# Patient Record
Sex: FEMALE | Race: WHITE | NOT HISPANIC OR LATINO | Employment: FULL TIME | URBAN - METROPOLITAN AREA
[De-identification: names, ages, dates, MRNs, and addresses within clinical notes are randomized per-mention and may not be internally consistent; named-entity substitution may affect disease eponyms.]

---

## 2017-01-18 ENCOUNTER — PATIENT MESSAGE (OUTPATIENT)
Dept: INTERNAL MEDICINE | Facility: CLINIC | Age: 52
End: 2017-01-18

## 2017-01-18 DIAGNOSIS — Z12.39 SCREENING FOR MALIGNANT NEOPLASM OF BREAST: Primary | ICD-10-CM

## 2017-01-19 ENCOUNTER — OFFICE VISIT (OUTPATIENT)
Dept: INTERNAL MEDICINE | Facility: CLINIC | Age: 52
End: 2017-01-19
Payer: COMMERCIAL

## 2017-01-19 VITALS
HEART RATE: 84 BPM | SYSTOLIC BLOOD PRESSURE: 110 MMHG | BODY MASS INDEX: 31.5 KG/M2 | WEIGHT: 196 LBS | DIASTOLIC BLOOD PRESSURE: 68 MMHG | HEIGHT: 66 IN | TEMPERATURE: 98 F

## 2017-01-19 DIAGNOSIS — R30.0 DYSURIA: Primary | ICD-10-CM

## 2017-01-19 LAB
BILIRUB SERPL-MCNC: NEGATIVE MG/DL
BLOOD URINE, POC: NORMAL
COLOR, POC UA: YELLOW
GLUCOSE UR QL STRIP: NORMAL
KETONES UR QL STRIP: NEGATIVE
LEUKOCYTE ESTERASE URINE, POC: NORMAL
NITRITE, POC UA: POSITIVE
PH, POC UA: 6
PROTEIN, POC: NEGATIVE
SPECIFIC GRAVITY, POC UA: 1.01
UROBILINOGEN, POC UA: NORMAL

## 2017-01-19 PROCEDURE — 87186 SC STD MICRODIL/AGAR DIL: CPT

## 2017-01-19 PROCEDURE — 3074F SYST BP LT 130 MM HG: CPT | Mod: S$GLB,,, | Performed by: FAMILY MEDICINE

## 2017-01-19 PROCEDURE — 1159F MED LIST DOCD IN RCRD: CPT | Mod: S$GLB,,, | Performed by: FAMILY MEDICINE

## 2017-01-19 PROCEDURE — 99213 OFFICE O/P EST LOW 20 MIN: CPT | Mod: 25,S$GLB,, | Performed by: FAMILY MEDICINE

## 2017-01-19 PROCEDURE — 87077 CULTURE AEROBIC IDENTIFY: CPT

## 2017-01-19 PROCEDURE — 3078F DIAST BP <80 MM HG: CPT | Mod: S$GLB,,, | Performed by: FAMILY MEDICINE

## 2017-01-19 PROCEDURE — 87088 URINE BACTERIA CULTURE: CPT

## 2017-01-19 PROCEDURE — 87086 URINE CULTURE/COLONY COUNT: CPT

## 2017-01-19 PROCEDURE — 99999 PR PBB SHADOW E&M-EST. PATIENT-LVL III: CPT | Mod: PBBFAC,,, | Performed by: FAMILY MEDICINE

## 2017-01-19 PROCEDURE — 81002 URINALYSIS NONAUTO W/O SCOPE: CPT | Mod: S$GLB,,, | Performed by: FAMILY MEDICINE

## 2017-01-19 RX ORDER — CIPROFLOXACIN 500 MG/1
500 TABLET ORAL 2 TIMES DAILY
Qty: 10 TABLET | Refills: 0 | Status: SHIPPED | OUTPATIENT
Start: 2017-01-19 | End: 2017-01-24

## 2017-01-19 NOTE — PROGRESS NOTES
Subjective:      Patient ID: Makayla Neal is a 51 y.o. female.    Chief Complaint: burning during urination    HPI Comments: Patient recently completed back surgery where she was catheterized during the procedure.  She is healed well from her lumbar spinal surgery however this week she noticed that she was having burning with urination.  She also started her menstrual cycle today which was the first time in about a year and a half.  She's denies any fever.    Dysuria    This is a new problem. The current episode started in the past 7 days. The problem occurs every urination. The problem has been gradually worsening. The quality of the pain is described as aching and burning. The pain is at a severity of 5/10. The pain is moderate. There has been no fever. She is sexually active. There is no history of pyelonephritis. Associated symptoms include frequency, hematuria and urgency. Pertinent negatives include no discharge, flank pain, nausea or withholding. She has tried increased fluids (azo) for the symptoms. The treatment provided mild relief. Her past medical history is significant for catheterization.       Lab Results   Component Value Date    WBC 6.46 10/11/2016    HGB 12.2 10/11/2016    HCT 37.8 10/11/2016     10/11/2016    CHOL 193 02/10/2016    TRIG 160 (H) 02/10/2016    HDL 64 02/10/2016    ALT 18 10/11/2016    AST 21 10/11/2016     10/11/2016    K 3.9 10/11/2016     10/11/2016    CREATININE 0.9 10/11/2016    BUN 15 10/11/2016    CO2 31 (H) 10/11/2016    TSH 3.142 02/10/2016    INR 1.0 10/11/2016    HGBA1C 5.5 02/10/2016       Review of Systems   Constitutional: Negative for activity change, appetite change, fatigue and unexpected weight change.   Gastrointestinal: Negative for nausea.   Genitourinary: Positive for dysuria, frequency, hematuria, urgency and vaginal bleeding. Negative for difficulty urinating, flank pain, menstrual problem, pelvic pain, vaginal discharge and vaginal pain.    Musculoskeletal: Negative for arthralgias and back pain.     Objective:     Physical Exam   Constitutional: She appears well-developed and well-nourished.   Abdominal: Soft. She exhibits no distension. There is no tenderness.   Musculoskeletal:   Well healed incision over lumbar spine of L4-S1   Vitals reviewed.    Assessment:     1. Dysuria      Plan:   Makayla was seen today for burning during urination.    Diagnoses and all orders for this visit:    Dysuria- new. Suspect uti. + nitrates, leukocytes and blood.   Comments:   x 1 week, taking with azo, tx with abx of cipro x 5 days, culture urine.   Orders:  -     POCT URINE DIPSTICK WITHOUT MICROSCOPE; Future  -     Urine culture; Future  -     Urine culture  -     POCT URINE DIPSTICK WITHOUT MICROSCOPE    Other orders  -     ciprofloxacin HCl (CIPRO) 500 MG tablet; Take 1 tablet (500 mg total) by mouth 2 (two) times daily.            Return if symptoms worsen or fail to improve.

## 2017-01-21 LAB — BACTERIA UR CULT: NORMAL

## 2017-01-25 ENCOUNTER — PATIENT MESSAGE (OUTPATIENT)
Dept: INTERNAL MEDICINE | Facility: CLINIC | Age: 52
End: 2017-01-25

## 2017-01-25 ENCOUNTER — TELEPHONE (OUTPATIENT)
Dept: INTERNAL MEDICINE | Facility: CLINIC | Age: 52
End: 2017-01-25

## 2017-01-25 DIAGNOSIS — N39.0 URINARY TRACT INFECTION WITHOUT HEMATURIA, SITE UNSPECIFIED: Primary | ICD-10-CM

## 2017-01-25 RX ORDER — NITROFURANTOIN 25; 75 MG/1; MG/1
100 CAPSULE ORAL 2 TIMES DAILY
Qty: 20 CAPSULE | Refills: 0 | Status: SHIPPED | OUTPATIENT
Start: 2017-01-25 | End: 2017-02-04

## 2017-01-25 NOTE — TELEPHONE ENCOUNTER
Please have patient drop off another urine culture due to still having uti symptoms after cipro which urine was sensitive to cipro. I sent in macrobid for her to start after she drops off next specimen.

## 2017-01-27 ENCOUNTER — LAB VISIT (OUTPATIENT)
Dept: LAB | Facility: HOSPITAL | Age: 52
End: 2017-01-27
Attending: FAMILY MEDICINE
Payer: COMMERCIAL

## 2017-01-27 DIAGNOSIS — N39.0 URINARY TRACT INFECTION WITHOUT HEMATURIA, SITE UNSPECIFIED: ICD-10-CM

## 2017-01-27 PROCEDURE — 87086 URINE CULTURE/COLONY COUNT: CPT

## 2017-01-28 LAB — BACTERIA UR CULT: NO GROWTH

## 2017-02-09 ENCOUNTER — PATIENT OUTREACH (OUTPATIENT)
Dept: ADMINISTRATIVE | Facility: HOSPITAL | Age: 52
End: 2017-02-09

## 2017-02-27 ENCOUNTER — PATIENT MESSAGE (OUTPATIENT)
Dept: INTERNAL MEDICINE | Facility: CLINIC | Age: 52
End: 2017-02-27

## 2017-02-27 DIAGNOSIS — I10 ESSENTIAL HYPERTENSION: ICD-10-CM

## 2017-02-27 DIAGNOSIS — E55.9 VITAMIN D DEFICIENCY DISEASE: ICD-10-CM

## 2017-02-27 RX ORDER — OLMESARTAN MEDOXOMIL AND HYDROCHLOROTHIAZIDE 20/12.5 20; 12.5 MG/1; MG/1
1 TABLET ORAL DAILY
Qty: 90 TABLET | Refills: 1 | Status: CANCELLED | OUTPATIENT
Start: 2017-02-27

## 2017-02-27 RX ORDER — ERGOCALCIFEROL 1.25 MG/1
50000 CAPSULE ORAL
Qty: 24 CAPSULE | Refills: 1 | Status: SHIPPED | OUTPATIENT
Start: 2017-02-27 | End: 2017-05-09 | Stop reason: SDUPTHER

## 2017-02-27 RX ORDER — OLMESARTAN MEDOXOMIL AND HYDROCHLOROTHIAZIDE 20/12.5 20; 12.5 MG/1; MG/1
1 TABLET ORAL DAILY
Qty: 90 TABLET | Refills: 1 | Status: SHIPPED | OUTPATIENT
Start: 2017-02-27 | End: 2017-05-09 | Stop reason: SDUPTHER

## 2017-02-27 RX ORDER — FUROSEMIDE 20 MG/1
20 TABLET ORAL EVERY MORNING
Qty: 90 TABLET | Refills: 1 | Status: SHIPPED | OUTPATIENT
Start: 2017-02-27 | End: 2017-05-09 | Stop reason: SDUPTHER

## 2017-03-16 DIAGNOSIS — I10 ESSENTIAL HYPERTENSION: Primary | ICD-10-CM

## 2017-05-09 ENCOUNTER — LAB VISIT (OUTPATIENT)
Dept: LAB | Facility: HOSPITAL | Age: 52
End: 2017-05-09
Attending: FAMILY MEDICINE
Payer: COMMERCIAL

## 2017-05-09 ENCOUNTER — OFFICE VISIT (OUTPATIENT)
Dept: INTERNAL MEDICINE | Facility: CLINIC | Age: 52
End: 2017-05-09
Payer: COMMERCIAL

## 2017-05-09 VITALS
BODY MASS INDEX: 31.89 KG/M2 | TEMPERATURE: 98 F | SYSTOLIC BLOOD PRESSURE: 100 MMHG | HEART RATE: 72 BPM | DIASTOLIC BLOOD PRESSURE: 80 MMHG | WEIGHT: 198.44 LBS | HEIGHT: 66 IN

## 2017-05-09 DIAGNOSIS — I10 ESSENTIAL HYPERTENSION: ICD-10-CM

## 2017-05-09 DIAGNOSIS — Z00.00 ROUTINE GENERAL MEDICAL EXAMINATION AT A HEALTH CARE FACILITY: ICD-10-CM

## 2017-05-09 DIAGNOSIS — E55.9 VITAMIN D DEFICIENCY DISEASE: ICD-10-CM

## 2017-05-09 DIAGNOSIS — Z12.31 ENCOUNTER FOR SCREENING MAMMOGRAM FOR BREAST CANCER: ICD-10-CM

## 2017-05-09 DIAGNOSIS — Z23 NEED FOR TDAP VACCINATION: ICD-10-CM

## 2017-05-09 DIAGNOSIS — Z12.11 COLON CANCER SCREENING: ICD-10-CM

## 2017-05-09 DIAGNOSIS — Z00.00 ROUTINE GENERAL MEDICAL EXAMINATION AT A HEALTH CARE FACILITY: Primary | ICD-10-CM

## 2017-05-09 LAB
25(OH)D3+25(OH)D2 SERPL-MCNC: 44 NG/ML
ALBUMIN SERPL BCP-MCNC: 4.1 G/DL
ALP SERPL-CCNC: 116 U/L
ALT SERPL W/O P-5'-P-CCNC: 24 U/L
ANION GAP SERPL CALC-SCNC: 13 MMOL/L
AST SERPL-CCNC: 26 U/L
BILIRUB SERPL-MCNC: 0.6 MG/DL
BUN SERPL-MCNC: 25 MG/DL
CALCIUM SERPL-MCNC: 9.5 MG/DL
CHLORIDE SERPL-SCNC: 100 MMOL/L
CHOLEST/HDLC SERPL: 3.3 {RATIO}
CO2 SERPL-SCNC: 27 MMOL/L
CREAT SERPL-MCNC: 0.9 MG/DL
EST. GFR  (AFRICAN AMERICAN): >60 ML/MIN/1.73 M^2
EST. GFR  (NON AFRICAN AMERICAN): >60 ML/MIN/1.73 M^2
GLUCOSE SERPL-MCNC: 76 MG/DL
HDL/CHOLESTEROL RATIO: 30.1 %
HDLC SERPL-MCNC: 216 MG/DL
HDLC SERPL-MCNC: 65 MG/DL
LDLC SERPL CALC-MCNC: 123.6 MG/DL
NONHDLC SERPL-MCNC: 151 MG/DL
POTASSIUM SERPL-SCNC: 4.2 MMOL/L
PROT SERPL-MCNC: 7.4 G/DL
SODIUM SERPL-SCNC: 140 MMOL/L
TRIGL SERPL-MCNC: 137 MG/DL
TSH SERPL DL<=0.005 MIU/L-ACNC: 3.66 UIU/ML

## 2017-05-09 PROCEDURE — 90471 IMMUNIZATION ADMIN: CPT | Mod: S$GLB,,, | Performed by: FAMILY MEDICINE

## 2017-05-09 PROCEDURE — 82306 VITAMIN D 25 HYDROXY: CPT

## 2017-05-09 PROCEDURE — 99396 PREV VISIT EST AGE 40-64: CPT | Mod: S$GLB,,, | Performed by: FAMILY MEDICINE

## 2017-05-09 PROCEDURE — 36415 COLL VENOUS BLD VENIPUNCTURE: CPT | Mod: PO

## 2017-05-09 PROCEDURE — 84443 ASSAY THYROID STIM HORMONE: CPT

## 2017-05-09 PROCEDURE — 90715 TDAP VACCINE 7 YRS/> IM: CPT | Mod: S$GLB,,, | Performed by: FAMILY MEDICINE

## 2017-05-09 PROCEDURE — 80053 COMPREHEN METABOLIC PANEL: CPT

## 2017-05-09 PROCEDURE — 3074F SYST BP LT 130 MM HG: CPT | Mod: S$GLB,,, | Performed by: FAMILY MEDICINE

## 2017-05-09 PROCEDURE — 3079F DIAST BP 80-89 MM HG: CPT | Mod: S$GLB,,, | Performed by: FAMILY MEDICINE

## 2017-05-09 PROCEDURE — 80061 LIPID PANEL: CPT

## 2017-05-09 PROCEDURE — 99999 PR PBB SHADOW E&M-EST. PATIENT-LVL III: CPT | Mod: PBBFAC,,, | Performed by: FAMILY MEDICINE

## 2017-05-09 RX ORDER — OLMESARTAN MEDOXOMIL AND HYDROCHLOROTHIAZIDE 20/12.5 20; 12.5 MG/1; MG/1
1 TABLET ORAL DAILY
Qty: 90 TABLET | Refills: 3 | Status: SHIPPED | OUTPATIENT
Start: 2017-05-09 | End: 2018-05-10 | Stop reason: SDUPTHER

## 2017-05-09 RX ORDER — TRAMADOL HYDROCHLORIDE 50 MG/1
50 TABLET ORAL DAILY PRN
Qty: 90 TABLET | Refills: 1 | Status: SHIPPED | OUTPATIENT
Start: 2017-05-09 | End: 2018-05-10

## 2017-05-09 RX ORDER — ERGOCALCIFEROL 1.25 MG/1
50000 CAPSULE ORAL
Qty: 24 CAPSULE | Refills: 1 | Status: SHIPPED | OUTPATIENT
Start: 2017-05-11 | End: 2018-05-10 | Stop reason: SDUPTHER

## 2017-05-09 RX ORDER — FUROSEMIDE 20 MG/1
20 TABLET ORAL EVERY MORNING
Qty: 90 TABLET | Refills: 3 | Status: SHIPPED | OUTPATIENT
Start: 2017-05-09 | End: 2018-05-10 | Stop reason: SDUPTHER

## 2017-05-09 NOTE — PROGRESS NOTES
Subjective:      Patient ID: Makayla Neal is a 52 y.o. female.    Chief Complaint: Annual Exam    HPI Comments: Patient presents today for prevention exam.  Since I last saw her she is doing well.  Her blood pressures very well controlled with her medications are Benicar HCT and furosemide.  She has had back surgery and now is only on tramadol once per day.  She denies a complex with her back.  She is needing to get back in with her gynecologist as it's been more than 3 years since her last Pap smear and more than 2 years since her last mammogram.  She sees Dr. Hiwot Reardon.  Vitamin D has been low but has been controlled with twice weekly supplementation.       Lab Results   Component Value Date    WBC 6.46 10/11/2016    HGB 12.2 10/11/2016    HCT 37.8 10/11/2016     10/11/2016    CHOL 193 02/10/2016    TRIG 160 (H) 02/10/2016    HDL 64 02/10/2016    ALT 18 10/11/2016    AST 21 10/11/2016     10/11/2016    K 3.9 10/11/2016     10/11/2016    CREATININE 0.9 10/11/2016    BUN 15 10/11/2016    CO2 31 (H) 10/11/2016    TSH 3.142 02/10/2016    INR 1.0 10/11/2016    HGBA1C 5.5 02/10/2016       Review of Systems   Constitutional: Negative for chills, fatigue and fever.   HENT: Negative for ear pain and trouble swallowing.    Eyes: Negative for pain and visual disturbance.   Respiratory: Negative for cough and shortness of breath.    Cardiovascular: Negative for chest pain and leg swelling.   Gastrointestinal: Negative for abdominal pain, blood in stool, nausea and vomiting.   Endocrine: Negative for cold intolerance and heat intolerance.   Genitourinary: Negative for dysuria and frequency.   Musculoskeletal: Positive for arthralgias. Negative for back pain, joint swelling, myalgias and neck pain.   Skin: Negative for color change and rash.   Neurological: Negative for dizziness and headaches.   Psychiatric/Behavioral: Negative for behavioral problems, decreased concentration, dysphoric mood and sleep  disturbance. The patient is not nervous/anxious.      Objective:     Physical Exam   Constitutional: She is oriented to person, place, and time. She appears well-developed and well-nourished.   HENT:   Head: Normocephalic and atraumatic.   Right Ear: External ear normal.   Left Ear: External ear normal.   Mouth/Throat: Oropharynx is clear and moist.   Eyes: EOM are normal.   Neck: Normal range of motion. Neck supple. No thyromegaly present.   Cardiovascular: Normal rate and regular rhythm.  Exam reveals no gallop and no friction rub.    No murmur heard.  Pulmonary/Chest: Effort normal. No respiratory distress. She has no wheezes. She has no rales.   Abdominal: Soft. Bowel sounds are normal. She exhibits no distension. There is no tenderness. There is no rebound.   Musculoskeletal: Normal range of motion. She exhibits no edema.   Lymphadenopathy:     She has no cervical adenopathy.   Neurological: She is alert and oriented to person, place, and time.   Skin: Skin is warm and dry. No rash noted.   Psychiatric: She has a normal mood and affect. Her behavior is normal. Judgment and thought content normal.   Vitals reviewed.    Assessment:     1. Routine general medical examination at a health care facility    2. Essential hypertension    3. Vitamin D deficiency disease    4. Colon cancer screening    5. Need for Tdap vaccination    6. Encounter for screening mammogram for breast cancer      Plan:   Makayla was seen today for annual exam.    Diagnoses and all orders for this visit:    Routine general medical examination at a health care facility-labs today discussed health maintenance putting orders for mammogram advised patient to get back in with her gynecologist also placed order for colonoscopy  -     Vitamin D; Future  -     Comprehensive metabolic panel; Future    Essential hypertension-labs today controlled with medications refilled  -     furosemide (LASIX) 20 MG tablet; Take 1 tablet (20 mg total) by mouth every  morning.  -     olmesartan-hydrochlorothiazide (BENICAR HCT) 20-12.5 mg per tablet; Take 1 tablet by mouth once daily.  -     Vitamin D; Future  -     Comprehensive metabolic panel; Future    Vitamin D deficiency disease-labs today controlled with medications refilled    -     ergocalciferol (ERGOCALCIFEROL) 50,000 unit Cap; Take 1 capsule (50,000 Units total) by mouth twice a week.  -     Vitamin D; Future  -     Comprehensive metabolic panel; Future    Colon cancer screening  -     Case request GI: COLONOSCOPY    Need for Tdap vaccination-update today  -     Tdap Vaccine    Encounter for screening mammogram for breast cancer-dose through [facility]  -     Mammo Digital Screening Bilat with CAD; Future  -     Mammo Digital Screening Bilat with CAD    Other orders-doing well status post back surgery on 1 tramadol per day advised patient she will need to be seen in 6 months if I'm to continue with this medication.  -     tramadol (ULTRAM) 50 mg tablet; Take 1 tablet (50 mg total) by mouth daily as needed.            Return in about 1 year (around 5/9/2018) for physical.

## 2017-08-14 DIAGNOSIS — I10 ESSENTIAL HYPERTENSION: ICD-10-CM

## 2017-08-14 RX ORDER — OLMESARTAN MEDOXOMIL-HYDROCHLOROTHIAZIDE 20; 12.5 MG/1; MG/1
1 TABLET, FILM COATED ORAL DAILY
Qty: 90 TABLET | Refills: 1 | Status: SHIPPED | OUTPATIENT
Start: 2017-08-14 | End: 2018-05-10 | Stop reason: SDUPTHER

## 2017-09-19 DIAGNOSIS — E55.9 VITAMIN D DEFICIENCY DISEASE: ICD-10-CM

## 2017-09-20 RX ORDER — ERGOCALCIFEROL 1.25 MG/1
CAPSULE ORAL
Qty: 24 CAPSULE | Refills: 1 | Status: SHIPPED | OUTPATIENT
Start: 2017-09-20 | End: 2018-03-20 | Stop reason: SDUPTHER

## 2018-03-20 DIAGNOSIS — E55.9 VITAMIN D DEFICIENCY DISEASE: ICD-10-CM

## 2018-03-20 RX ORDER — ERGOCALCIFEROL 1.25 MG/1
CAPSULE ORAL
Qty: 24 CAPSULE | Refills: 0 | Status: SHIPPED | OUTPATIENT
Start: 2018-03-20 | End: 2018-05-10 | Stop reason: SDUPTHER

## 2018-05-10 ENCOUNTER — OFFICE VISIT (OUTPATIENT)
Dept: INTERNAL MEDICINE | Facility: CLINIC | Age: 53
End: 2018-05-10
Payer: COMMERCIAL

## 2018-05-10 ENCOUNTER — LAB VISIT (OUTPATIENT)
Dept: LAB | Facility: HOSPITAL | Age: 53
End: 2018-05-10
Attending: FAMILY MEDICINE
Payer: COMMERCIAL

## 2018-05-10 VITALS
TEMPERATURE: 97 F | WEIGHT: 206.56 LBS | DIASTOLIC BLOOD PRESSURE: 80 MMHG | HEART RATE: 72 BPM | SYSTOLIC BLOOD PRESSURE: 122 MMHG | BODY MASS INDEX: 33.2 KG/M2 | HEIGHT: 66 IN

## 2018-05-10 DIAGNOSIS — Z00.00 ROUTINE GENERAL MEDICAL EXAMINATION AT A HEALTH CARE FACILITY: ICD-10-CM

## 2018-05-10 DIAGNOSIS — E55.9 VITAMIN D DEFICIENCY DISEASE: ICD-10-CM

## 2018-05-10 DIAGNOSIS — E55.9 VITAMIN D DEFICIENCY: ICD-10-CM

## 2018-05-10 DIAGNOSIS — Z12.31 ENCOUNTER FOR SCREENING MAMMOGRAM FOR BREAST CANCER: ICD-10-CM

## 2018-05-10 DIAGNOSIS — Z12.11 COLON CANCER SCREENING: ICD-10-CM

## 2018-05-10 DIAGNOSIS — G47.00 INSOMNIA, UNSPECIFIED TYPE: ICD-10-CM

## 2018-05-10 DIAGNOSIS — I10 ESSENTIAL HYPERTENSION: ICD-10-CM

## 2018-05-10 DIAGNOSIS — E28.319 EARLY MENOPAUSE: ICD-10-CM

## 2018-05-10 DIAGNOSIS — R41.840 CONCENTRATION DEFICIT: ICD-10-CM

## 2018-05-10 DIAGNOSIS — Z12.4 ENCOUNTER FOR SCREENING FOR CERVICAL CANCER: ICD-10-CM

## 2018-05-10 LAB
25(OH)D3+25(OH)D2 SERPL-MCNC: 45 NG/ML
ALBUMIN SERPL BCP-MCNC: 4.3 G/DL
ALP SERPL-CCNC: 125 U/L
ALT SERPL W/O P-5'-P-CCNC: 28 U/L
ANION GAP SERPL CALC-SCNC: 11 MMOL/L
AST SERPL-CCNC: 39 U/L
BASOPHILS # BLD AUTO: 0.03 K/UL
BASOPHILS NFR BLD: 0.5 %
BILIRUB SERPL-MCNC: 0.5 MG/DL
BUN SERPL-MCNC: 16 MG/DL
CALCIUM SERPL-MCNC: 9.9 MG/DL
CHLORIDE SERPL-SCNC: 102 MMOL/L
CHOLEST SERPL-MCNC: 235 MG/DL
CHOLEST/HDLC SERPL: 3.8 {RATIO}
CO2 SERPL-SCNC: 29 MMOL/L
CREAT SERPL-MCNC: 0.9 MG/DL
CREAT UR-MCNC: 32 MG/DL
DIFFERENTIAL METHOD: ABNORMAL
EOSINOPHIL # BLD AUTO: 0.1 K/UL
EOSINOPHIL NFR BLD: 1.6 %
ERYTHROCYTE [DISTWIDTH] IN BLOOD BY AUTOMATED COUNT: 13.4 %
EST. GFR  (AFRICAN AMERICAN): >60 ML/MIN/1.73 M^2
EST. GFR  (NON AFRICAN AMERICAN): >60 ML/MIN/1.73 M^2
ESTIMATED AVG GLUCOSE: 111 MG/DL
ESTRADIOL SERPL-MCNC: 11 PG/ML
FSH SERPL-ACNC: 61.1 MIU/ML
GLUCOSE SERPL-MCNC: 86 MG/DL
HBA1C MFR BLD HPLC: 5.5 %
HCT VFR BLD AUTO: 40.8 %
HDLC SERPL-MCNC: 62 MG/DL
HDLC SERPL: 26.4 %
HGB BLD-MCNC: 12.1 G/DL
IMM GRANULOCYTES # BLD AUTO: 0.01 K/UL
IMM GRANULOCYTES NFR BLD AUTO: 0.2 %
IRON SERPL-MCNC: 84 UG/DL
LDLC SERPL CALC-MCNC: 147.6 MG/DL
LH SERPL-ACNC: 21.3 MIU/ML
LYMPHOCYTES # BLD AUTO: 1.9 K/UL
LYMPHOCYTES NFR BLD: 33.2 %
MCH RBC QN AUTO: 28.5 PG
MCHC RBC AUTO-ENTMCNC: 29.7 G/DL
MCV RBC AUTO: 96 FL
MICROALBUMIN UR DL<=1MG/L-MCNC: <2.5 UG/ML
MICROALBUMIN/CREATININE RATIO: NORMAL UG/MG
MONOCYTES # BLD AUTO: 0.4 K/UL
MONOCYTES NFR BLD: 7.6 %
NEUTROPHILS # BLD AUTO: 3.2 K/UL
NEUTROPHILS NFR BLD: 56.9 %
NONHDLC SERPL-MCNC: 173 MG/DL
NRBC BLD-RTO: 0 /100 WBC
PLATELET # BLD AUTO: 282 K/UL
PMV BLD AUTO: 11 FL
POTASSIUM SERPL-SCNC: 3.7 MMOL/L
PROGEST SERPL-MCNC: 0.2 NG/ML
PROT SERPL-MCNC: 7.5 G/DL
RBC # BLD AUTO: 4.24 M/UL
SATURATED IRON: 21 %
SODIUM SERPL-SCNC: 142 MMOL/L
T4 FREE SERPL-MCNC: 0.97 NG/DL
TOTAL IRON BINDING CAPACITY: 407 UG/DL
TRANSFERRIN SERPL-MCNC: 275 MG/DL
TRIGL SERPL-MCNC: 127 MG/DL
TSH SERPL DL<=0.005 MIU/L-ACNC: 3.51 UIU/ML
WBC # BLD AUTO: 5.63 K/UL

## 2018-05-10 PROCEDURE — 83525 ASSAY OF INSULIN: CPT

## 2018-05-10 PROCEDURE — 99214 OFFICE O/P EST MOD 30 MIN: CPT | Mod: 25,S$GLB,, | Performed by: FAMILY MEDICINE

## 2018-05-10 PROCEDURE — 36415 COLL VENOUS BLD VENIPUNCTURE: CPT | Mod: PO

## 2018-05-10 PROCEDURE — 83002 ASSAY OF GONADOTROPIN (LH): CPT

## 2018-05-10 PROCEDURE — 82306 VITAMIN D 25 HYDROXY: CPT

## 2018-05-10 PROCEDURE — 82043 UR ALBUMIN QUANTITATIVE: CPT

## 2018-05-10 PROCEDURE — 84439 ASSAY OF FREE THYROXINE: CPT

## 2018-05-10 PROCEDURE — 84144 ASSAY OF PROGESTERONE: CPT

## 2018-05-10 PROCEDURE — 99999 PR PBB SHADOW E&M-EST. PATIENT-LVL IV: CPT | Mod: PBBFAC,,, | Performed by: FAMILY MEDICINE

## 2018-05-10 PROCEDURE — 85025 COMPLETE CBC W/AUTO DIFF WBC: CPT

## 2018-05-10 PROCEDURE — 99396 PREV VISIT EST AGE 40-64: CPT | Mod: 25,S$GLB,, | Performed by: FAMILY MEDICINE

## 2018-05-10 PROCEDURE — 80053 COMPREHEN METABOLIC PANEL: CPT

## 2018-05-10 PROCEDURE — 83001 ASSAY OF GONADOTROPIN (FSH): CPT

## 2018-05-10 PROCEDURE — 84443 ASSAY THYROID STIM HORMONE: CPT

## 2018-05-10 PROCEDURE — 83540 ASSAY OF IRON: CPT

## 2018-05-10 PROCEDURE — 83036 HEMOGLOBIN GLYCOSYLATED A1C: CPT

## 2018-05-10 PROCEDURE — 82670 ASSAY OF TOTAL ESTRADIOL: CPT

## 2018-05-10 PROCEDURE — 80061 LIPID PANEL: CPT

## 2018-05-10 RX ORDER — FUROSEMIDE 20 MG/1
20 TABLET ORAL EVERY MORNING
Qty: 90 TABLET | Refills: 3 | Status: SHIPPED | OUTPATIENT
Start: 2018-05-10 | End: 2019-06-25

## 2018-05-10 RX ORDER — OLMESARTAN MEDOXOMIL AND HYDROCHLOROTHIAZIDE 20/12.5 20; 12.5 MG/1; MG/1
1 TABLET ORAL DAILY
Qty: 90 TABLET | Refills: 3 | Status: SHIPPED | OUTPATIENT
Start: 2018-05-10 | End: 2019-05-16 | Stop reason: SDUPTHER

## 2018-05-10 RX ORDER — ERGOCALCIFEROL 1.25 MG/1
50000 CAPSULE ORAL
Qty: 24 CAPSULE | Refills: 1 | Status: SHIPPED | OUTPATIENT
Start: 2018-05-10 | End: 2019-06-25

## 2018-05-10 RX ORDER — TRAZODONE HYDROCHLORIDE 50 MG/1
25-100 TABLET ORAL NIGHTLY PRN
Qty: 30 TABLET | Refills: 3 | Status: SHIPPED | OUTPATIENT
Start: 2018-05-10 | End: 2019-06-25 | Stop reason: SDUPTHER

## 2018-05-11 LAB
INSULIN COLLECTION INTERVAL: NORMAL
INSULIN SERPL-ACNC: 4.3 UU/ML

## 2018-05-12 PROBLEM — G47.00 INSOMNIA: Status: ACTIVE | Noted: 2018-05-12

## 2018-05-12 PROBLEM — E55.9 VITAMIN D DEFICIENCY DISEASE: Status: ACTIVE | Noted: 2018-05-12

## 2018-05-12 NOTE — PROGRESS NOTES
"Subjective:      Patient ID: Makayla Neal is a 53 y.o. female.    Chief Complaint: Annual Exam    Disclaimer:  This note is prepared using voice recognition software and as such is likely to have errors and has not been proof read. Please contact me for questions.     Patient's coming in today for prevention exam as well as.  Is needing to update and get a mammogram as well as get a evaluation for possible pelvic exam.  Has not had a Pap smear in over 3 years.  Was previously seeing Dr. Hiwot Reardon but would like to switch.  Is supposed to do colonoscopy at but has not yet.  Would be willing to do the  fitkit        Lab Results   Component Value Date    WBC 5.63 05/10/2018    HGB 12.1 05/10/2018    HCT 40.8 05/10/2018     05/10/2018    CHOL 235 (H) 05/10/2018    TRIG 127 05/10/2018    HDL 62 05/10/2018    ALT 28 05/10/2018    AST 39 05/10/2018     05/10/2018    K 3.7 05/10/2018     05/10/2018    CREATININE 0.9 05/10/2018    BUN 16 05/10/2018    CO2 29 05/10/2018    TSH 3.510 05/10/2018    INR 1.0 10/11/2016    HGBA1C 5.5 05/10/2018       Review of Systems   Constitutional: Negative for activity change and unexpected weight change.   HENT: Negative for hearing loss, rhinorrhea and trouble swallowing.    Eyes: Negative for discharge and visual disturbance.   Respiratory: Negative for chest tightness and wheezing.    Cardiovascular: Negative for chest pain and palpitations.   Gastrointestinal: Negative for blood in stool, constipation, diarrhea and vomiting.   Endocrine: Negative for polydipsia and polyuria.   Genitourinary: Negative for difficulty urinating, dysuria, hematuria and menstrual problem.   Musculoskeletal: Negative for arthralgias, joint swelling and neck pain.   Neurological: Negative for weakness and headaches.     Objective:     Vitals:    05/10/18 0759   BP: 122/80   Pulse: 72   Temp: 96.8 °F (36 °C)   TempSrc: Tympanic   Weight: 93.7 kg (206 lb 9.1 oz)   Height: 5' 6" (1.676 m) "     Physical Exam   HENT:   Head: Normocephalic and atraumatic.   Right Ear: Tympanic membrane and external ear normal.   Left Ear: Tympanic membrane and external ear normal.   Mouth/Throat: Oropharynx is clear and moist.   Eyes: EOM are normal.   Neck: Normal range of motion. Neck supple. No thyromegaly present.   Cardiovascular: Normal rate and regular rhythm.  Exam reveals no gallop and no friction rub.    No murmur heard.  Pulmonary/Chest: Effort normal. No respiratory distress. She has no wheezes. She has no rales.   Abdominal: Soft. Bowel sounds are normal. She exhibits no distension. There is no tenderness. There is no rebound.   Musculoskeletal: Normal range of motion. She exhibits no edema.   Lymphadenopathy:     She has no cervical adenopathy.   Skin: Skin is warm and dry. No rash noted.   Vitals reviewed.    Assessment:     1. Early menopause    2. Concentration deficit    3. Insomnia, unspecified type    4. Essential hypertension    5. Vitamin D deficiency disease    6. Routine general medical examination at a health care facility    7. Colon cancer screening    8. Encounter for screening mammogram for breast cancer    9. Encounter for screening for cervical cancer       Plan:   Makayla was seen today for annual exam.    Diagnoses and all orders for this visit:        Routine general medical examination at a health care facility-discussed health concerns will obtain cholesterol profile today as well as urine microalbumin levels since she has a diagnosis of blood pressure.  We'll set up for mammogram.  Also set her up for a pelvic exam and Pap smear with new gynecologist.  Discussed colon cancer screening at this time.  Does not have the time to do the colonoscopy but would be willing to do the immunochemical staining.  Provided her with a kit today.  Discussed dietary changes.  -     TSH; Future  -     T4, free; Future  -     Lipid panel; Future  -     Microalbumin/creatinine urine ratio  -     Hemoglobin  A1c; Future  -     Comprehensive metabolic panel; Future  -     Vitamin D; Future  -     CBC auto differential; Future  -     Iron and TIBC; Future  -     Insulin, random; Future  -     Follicle stimulating hormone; Future  -     Estradiol; Future  -     Luteinizing hormone; Future  -     PROGESTERONE; Future    Colon cancer screening  -     Fecal Immunochemical Test (iFOBT); Future    Encounter for screening mammogram for breast cancer  -     Cancel: Mammo Digital Screening Bilat with CAD; Future  -     Cancel: Mammo Digital Screening Bilat with CAD  -     Mammo Digital Screening Bilat with CAD; Future  -     Ambulatory referral to Obstetrics / Gynecology    Encounter for screening for cervical cancer   -     Mammo Digital Screening Bilat with CAD; Future  -     Ambulatory referral to Obstetrics / Gynecology    Other orders              Follow-up in about 1 year (around 5/10/2019) for physical with Dr GAO.

## 2018-05-12 NOTE — PROGRESS NOTES
Subjective:      Patient ID: Makayla Neal is a 53 y.o. female.    Chief Complaint: Fatigue, concentration issues, sleep issues  Disclaimer:  This note is prepared using voice recognition software and as such is likely to have errors and has not been proof read. Please contact me for questions.     Patient's coming in today mainly for her annual exam but she also reports conditions of not being able to focus work anymore.  Can't seem to switch beers as well as she normally would.  Feels very stressed out.  Was uncertain if she's had something new to cause more attention issues.  Doesn't bother her as much at home as it does more with work and she does not want to show this in her work environment.  She's been employed with the same job for over 10:15 years.  In the past she seen Dr. Hiwot Reardon but she is not very happy with the care.  She exited tried to get an appointment but was told that because it's been more than 3 year she would be a new patient.  She was advised to hold off on following up for 3 years because of the    Guidelines.  She like to be referred to a new gynecologist.  She reports that she really has not had a menstrual cycle in quite some time.  She had one episode where she might of had some light spotting but otherwise it resolved on its own.  We did discuss the potential that she may be going through menopausal symptoms currently.    She also does have a history of vitamin D deficiency in the past.  Blood pressures currently controlled with Benicar HCTZ.    She's also been having issues as well with sleep.  Does not find that she sleeps very well.  Usually ends up having to take a Benadryl to help her at nighttime.  Also does occasionally have hot flashes as well at night.            Review of Systems   Constitutional: Positive for activity change and fatigue.   Endocrine: Positive for heat intolerance.   Genitourinary: Negative for menstrual problem and pelvic pain.   Neurological: Positive  "for headaches. Negative for light-headedness.   Psychiatric/Behavioral: Positive for decreased concentration, dysphoric mood and sleep disturbance.     Objective:     Vitals:    05/10/18 0759   BP: 122/80   Pulse: 72   Temp: 96.8 °F (36 °C)   TempSrc: Tympanic   Weight: 93.7 kg (206 lb 9.1 oz)   Height: 5' 6" (1.676 m)     Physical Exam   Constitutional: She is oriented to person, place, and time. She appears well-developed. She appears distressed.   Neurological: She is alert and oriented to person, place, and time. No cranial nerve deficit.   Psychiatric: Her speech is normal. Judgment and thought content normal. Her affect is labile. She is withdrawn. Cognition and memory are normal. She is attentive.     Assessment:     1. Early menopause    2. Concentration deficit    3. Insomnia, unspecified type    4. Essential hypertension    5. Vitamin D deficiency disease    6. Routine general medical examination at a health care facility    7. Colon cancer screening    8. Encounter for screening mammogram for breast cancer    9. Encounter for screening for cervical cancer       Plan:   Makayla was seen today for possible menopause, concentration issues and insomnia  Diagnoses and all orders for this visit:    Early menopause-not controlled currently with symptoms will check labs today including hormone levels thyroid conditions as well as other causes to see if she is currently in menopause and if she would possibly benefit from doing some type of hormonal treatment.  We'll go ahead and start trazodone at this time to help her with sleep.  Will refer to new gynecologist for pelvic exam and for possible hormone replacement therapy.  -     TSH; Future  -     T4, free; Future  -     Lipid panel; Future  -     Microalbumin/creatinine urine ratio  -     Hemoglobin A1c; Future  -     Comprehensive metabolic panel; Future  -     Vitamin D; Future  -     CBC auto differential; Future  -     Iron and TIBC; Future  -     Insulin, " random; Future  -     Follicle stimulating hormone; Future  -     Estradiol; Future  -     Luteinizing hormone; Future  -     PROGESTERONE; Future  -     Mammo Digital Screening Bilat with CAD; Future  -     Ambulatory referral to Obstetrics / Gynecology    Concentration deficit -not controlled currently with symptoms will check labs today including hormone levels thyroid conditions as well as other causes to see if she is currently in menopause and if she would possibly benefit from doing some type of hormonal treatment.  We'll go ahead and start trazodone at this time to help her with sleep.  Will refer to new gynecologist for pelvic exam and for possible hormone replacement therapy.    -     TSH; Future  -     T4, free; Future  -     Lipid panel; Future  -     Microalbumin/creatinine urine ratio  -     Hemoglobin A1c; Future  -     Comprehensive metabolic panel; Future  -     Vitamin D; Future  -     CBC auto differential; Future  -     Iron and TIBC; Future  -     Insulin, random; Future  -     Follicle stimulating hormone; Future  -     Estradiol; Future  -     Luteinizing hormone; Future  -     PROGESTERONE; Future    Insomnia, unspecified type -not controlled currently with symptoms will check labs today including hormone levels thyroid conditions as well as other causes to see if she is currently in menopause and if she would possibly benefit from doing some type of hormonal treatment.  We'll go ahead and start trazodone at this time to help her with sleep.  Will refer to new gynecologist for pelvic exam and for possible hormone replacement therapy.      Essential hypertension-stable checking labs today continue with Benicar HCT and Lasix  -     TSH; Future  -     T4, free; Future  -     Lipid panel; Future  -     Microalbumin/creatinine urine ratio  -     Hemoglobin A1c; Future  -     Comprehensive metabolic panel; Future  -     Vitamin D; Future  -     CBC auto differential; Future  -     Iron and TIBC;  Future  -     Insulin, random; Future  -     Follicle stimulating hormone; Future  -     Estradiol; Future  -     Luteinizing hormone; Future  -     PROGESTERONE; Future  -     olmesartan-hydrochlorothiazide (BENICAR HCT) 20-12.5 mg per tablet; Take 1 tablet by mouth once daily.  -     furosemide (LASIX) 20 MG tablet; Take 1 tablet (20 mg total) by mouth every morning.    Vitamin D deficiency disease-continue with twice weekly dosing  -     ergocalciferol (ERGOCALCIFEROL) 50,000 unit Cap; Take 1 capsule (50,000 Units total) by mouth twice a week.    Other orders  -     traZODone (DESYREL) 50 MG tablet; Take 0.5-2 tablets ( mg total) by mouth nightly as needed for Insomnia.            Follow-up in about 1 year (around 5/10/2019) for physical with Dr GAO.

## 2018-07-05 DIAGNOSIS — E55.9 VITAMIN D DEFICIENCY DISEASE: ICD-10-CM

## 2018-07-05 RX ORDER — ERGOCALCIFEROL 1.25 MG/1
CAPSULE ORAL
Qty: 24 CAPSULE | Refills: 0 | Status: SHIPPED | OUTPATIENT
Start: 2018-07-05 | End: 2018-09-21 | Stop reason: SDUPTHER

## 2018-07-16 DIAGNOSIS — I10 ESSENTIAL HYPERTENSION: ICD-10-CM

## 2018-07-16 RX ORDER — FUROSEMIDE 20 MG/1
20 TABLET ORAL EVERY MORNING
Qty: 90 TABLET | Refills: 3 | Status: SHIPPED | OUTPATIENT
Start: 2018-07-16 | End: 2019-06-25 | Stop reason: SDUPTHER

## 2018-09-21 DIAGNOSIS — E55.9 VITAMIN D DEFICIENCY DISEASE: ICD-10-CM

## 2018-09-21 RX ORDER — ERGOCALCIFEROL 1.25 MG/1
CAPSULE ORAL
Qty: 24 CAPSULE | Refills: 0 | Status: SHIPPED | OUTPATIENT
Start: 2018-09-21 | End: 2019-06-25

## 2018-10-01 DIAGNOSIS — E55.9 VITAMIN D DEFICIENCY DISEASE: ICD-10-CM

## 2018-10-01 RX ORDER — ERGOCALCIFEROL 1.25 MG/1
CAPSULE ORAL
Qty: 24 CAPSULE | Refills: 0 | Status: SHIPPED | OUTPATIENT
Start: 2018-10-01 | End: 2019-06-25

## 2018-10-04 DIAGNOSIS — E55.9 VITAMIN D DEFICIENCY DISEASE: ICD-10-CM

## 2018-10-04 RX ORDER — ERGOCALCIFEROL 1.25 MG/1
CAPSULE ORAL
Qty: 24 CAPSULE | Refills: 0 | Status: SHIPPED | OUTPATIENT
Start: 2018-10-04 | End: 2019-05-29 | Stop reason: SDUPTHER

## 2019-05-16 DIAGNOSIS — I10 ESSENTIAL HYPERTENSION: ICD-10-CM

## 2019-05-16 RX ORDER — OLMESARTAN MEDOXOMIL AND HYDROCHLOROTHIAZIDE 20/12.5 20; 12.5 MG/1; MG/1
TABLET ORAL
Qty: 90 TABLET | Refills: 2 | Status: SHIPPED | OUTPATIENT
Start: 2019-05-16 | End: 2019-06-25 | Stop reason: SDUPTHER

## 2019-05-29 DIAGNOSIS — E55.9 VITAMIN D DEFICIENCY DISEASE: ICD-10-CM

## 2019-05-29 RX ORDER — ERGOCALCIFEROL 1.25 MG/1
CAPSULE ORAL
Qty: 24 CAPSULE | Refills: 0 | Status: SHIPPED | OUTPATIENT
Start: 2019-05-29 | End: 2019-06-25 | Stop reason: SDUPTHER

## 2019-06-25 ENCOUNTER — LAB VISIT (OUTPATIENT)
Dept: LAB | Facility: HOSPITAL | Age: 54
End: 2019-06-25
Attending: FAMILY MEDICINE
Payer: COMMERCIAL

## 2019-06-25 ENCOUNTER — OFFICE VISIT (OUTPATIENT)
Dept: INTERNAL MEDICINE | Facility: CLINIC | Age: 54
End: 2019-06-25
Payer: COMMERCIAL

## 2019-06-25 VITALS
SYSTOLIC BLOOD PRESSURE: 124 MMHG | BODY MASS INDEX: 31.29 KG/M2 | DIASTOLIC BLOOD PRESSURE: 82 MMHG | HEART RATE: 74 BPM | HEIGHT: 66 IN | WEIGHT: 194.69 LBS | TEMPERATURE: 97 F

## 2019-06-25 DIAGNOSIS — Z00.00 ROUTINE GENERAL MEDICAL EXAMINATION AT A HEALTH CARE FACILITY: ICD-10-CM

## 2019-06-25 DIAGNOSIS — Z12.11 COLON CANCER SCREENING: ICD-10-CM

## 2019-06-25 DIAGNOSIS — Z00.00 ROUTINE GENERAL MEDICAL EXAMINATION AT A HEALTH CARE FACILITY: Primary | ICD-10-CM

## 2019-06-25 DIAGNOSIS — E55.9 VITAMIN D DEFICIENCY DISEASE: ICD-10-CM

## 2019-06-25 DIAGNOSIS — Z12.31 ENCOUNTER FOR SCREENING MAMMOGRAM FOR BREAST CANCER: ICD-10-CM

## 2019-06-25 DIAGNOSIS — G47.00 INSOMNIA, UNSPECIFIED TYPE: ICD-10-CM

## 2019-06-25 DIAGNOSIS — I10 ESSENTIAL HYPERTENSION: ICD-10-CM

## 2019-06-25 LAB
25(OH)D3+25(OH)D2 SERPL-MCNC: 52 NG/ML (ref 30–96)
ALBUMIN SERPL BCP-MCNC: 4.4 G/DL (ref 3.5–5.2)
ALP SERPL-CCNC: 87 U/L (ref 55–135)
ALT SERPL W/O P-5'-P-CCNC: 23 U/L (ref 10–44)
ANION GAP SERPL CALC-SCNC: 10 MMOL/L (ref 8–16)
AST SERPL-CCNC: 34 U/L (ref 10–40)
BASOPHILS # BLD AUTO: 0.05 K/UL (ref 0–0.2)
BASOPHILS NFR BLD: 1.1 % (ref 0–1.9)
BILIRUB SERPL-MCNC: 0.4 MG/DL (ref 0.1–1)
BUN SERPL-MCNC: 20 MG/DL (ref 6–20)
CALCIUM SERPL-MCNC: 10.1 MG/DL (ref 8.7–10.5)
CHLORIDE SERPL-SCNC: 101 MMOL/L (ref 95–110)
CHOLEST SERPL-MCNC: 264 MG/DL (ref 120–199)
CHOLEST/HDLC SERPL: 3.6 {RATIO} (ref 2–5)
CO2 SERPL-SCNC: 30 MMOL/L (ref 23–29)
CREAT SERPL-MCNC: 0.9 MG/DL (ref 0.5–1.4)
DIFFERENTIAL METHOD: ABNORMAL
EOSINOPHIL # BLD AUTO: 0.1 K/UL (ref 0–0.5)
EOSINOPHIL NFR BLD: 1.7 % (ref 0–8)
ERYTHROCYTE [DISTWIDTH] IN BLOOD BY AUTOMATED COUNT: 13.5 % (ref 11.5–14.5)
EST. GFR  (AFRICAN AMERICAN): >60 ML/MIN/1.73 M^2
EST. GFR  (NON AFRICAN AMERICAN): >60 ML/MIN/1.73 M^2
GLUCOSE SERPL-MCNC: 89 MG/DL (ref 70–110)
HCT VFR BLD AUTO: 40.1 % (ref 37–48.5)
HDLC SERPL-MCNC: 73 MG/DL (ref 40–75)
HDLC SERPL: 27.7 % (ref 20–50)
HGB BLD-MCNC: 12.6 G/DL (ref 12–16)
IMM GRANULOCYTES # BLD AUTO: 0.01 K/UL (ref 0–0.04)
IMM GRANULOCYTES NFR BLD AUTO: 0.2 % (ref 0–0.5)
LDLC SERPL CALC-MCNC: 173.4 MG/DL (ref 63–159)
LYMPHOCYTES # BLD AUTO: 1.8 K/UL (ref 1–4.8)
LYMPHOCYTES NFR BLD: 37.2 % (ref 18–48)
MCH RBC QN AUTO: 29.5 PG (ref 27–31)
MCHC RBC AUTO-ENTMCNC: 31.4 G/DL (ref 32–36)
MCV RBC AUTO: 94 FL (ref 82–98)
MONOCYTES # BLD AUTO: 0.4 K/UL (ref 0.3–1)
MONOCYTES NFR BLD: 8.3 % (ref 4–15)
NEUTROPHILS # BLD AUTO: 2.4 K/UL (ref 1.8–7.7)
NEUTROPHILS NFR BLD: 51.5 % (ref 38–73)
NONHDLC SERPL-MCNC: 191 MG/DL
NRBC BLD-RTO: 0 /100 WBC
PLATELET # BLD AUTO: 264 K/UL (ref 150–350)
PMV BLD AUTO: 11.4 FL (ref 9.2–12.9)
POTASSIUM SERPL-SCNC: 3.9 MMOL/L (ref 3.5–5.1)
PROT SERPL-MCNC: 7.5 G/DL (ref 6–8.4)
RBC # BLD AUTO: 4.27 M/UL (ref 4–5.4)
SODIUM SERPL-SCNC: 141 MMOL/L (ref 136–145)
T4 FREE SERPL-MCNC: 1 NG/DL (ref 0.71–1.51)
TRIGL SERPL-MCNC: 88 MG/DL (ref 30–150)
TSH SERPL DL<=0.005 MIU/L-ACNC: 3.38 UIU/ML (ref 0.4–4)
WBC # BLD AUTO: 4.7 K/UL (ref 3.9–12.7)

## 2019-06-25 PROCEDURE — 85025 COMPLETE CBC W/AUTO DIFF WBC: CPT

## 2019-06-25 PROCEDURE — 90471 ZOSTER RECOMBINANT VACCINE: ICD-10-PCS | Mod: S$GLB,,, | Performed by: FAMILY MEDICINE

## 2019-06-25 PROCEDURE — 99396 PREV VISIT EST AGE 40-64: CPT | Mod: 25,S$GLB,, | Performed by: FAMILY MEDICINE

## 2019-06-25 PROCEDURE — 80053 COMPREHEN METABOLIC PANEL: CPT

## 2019-06-25 PROCEDURE — 83525 ASSAY OF INSULIN: CPT

## 2019-06-25 PROCEDURE — 80061 LIPID PANEL: CPT

## 2019-06-25 PROCEDURE — 90471 IMMUNIZATION ADMIN: CPT | Mod: S$GLB,,, | Performed by: FAMILY MEDICINE

## 2019-06-25 PROCEDURE — 84443 ASSAY THYROID STIM HORMONE: CPT

## 2019-06-25 PROCEDURE — 82043 UR ALBUMIN QUANTITATIVE: CPT

## 2019-06-25 PROCEDURE — 99396 PR PREVENTIVE VISIT,EST,40-64: ICD-10-PCS | Mod: 25,S$GLB,, | Performed by: FAMILY MEDICINE

## 2019-06-25 PROCEDURE — 82306 VITAMIN D 25 HYDROXY: CPT

## 2019-06-25 PROCEDURE — 99999 PR PBB SHADOW E&M-EST. PATIENT-LVL III: CPT | Mod: PBBFAC,,, | Performed by: FAMILY MEDICINE

## 2019-06-25 PROCEDURE — 90750 HZV VACC RECOMBINANT IM: CPT | Mod: S$GLB,,, | Performed by: FAMILY MEDICINE

## 2019-06-25 PROCEDURE — 84439 ASSAY OF FREE THYROXINE: CPT

## 2019-06-25 PROCEDURE — 83036 HEMOGLOBIN GLYCOSYLATED A1C: CPT

## 2019-06-25 PROCEDURE — 99999 PR PBB SHADOW E&M-EST. PATIENT-LVL III: ICD-10-PCS | Mod: PBBFAC,,, | Performed by: FAMILY MEDICINE

## 2019-06-25 PROCEDURE — 90750 ZOSTER RECOMBINANT VACCINE: ICD-10-PCS | Mod: S$GLB,,, | Performed by: FAMILY MEDICINE

## 2019-06-25 RX ORDER — ERGOCALCIFEROL 1.25 MG/1
CAPSULE ORAL
Qty: 24 CAPSULE | Refills: 3 | Status: SHIPPED | OUTPATIENT
Start: 2019-06-25 | End: 2020-08-11 | Stop reason: SDUPTHER

## 2019-06-25 RX ORDER — OLMESARTAN MEDOXOMIL AND HYDROCHLOROTHIAZIDE 20/12.5 20; 12.5 MG/1; MG/1
1 TABLET ORAL DAILY
Qty: 90 TABLET | Refills: 3 | Status: SHIPPED | OUTPATIENT
Start: 2019-06-25 | End: 2020-07-20 | Stop reason: SDUPTHER

## 2019-06-25 RX ORDER — FUROSEMIDE 20 MG/1
20 TABLET ORAL EVERY MORNING
Qty: 90 TABLET | Refills: 3 | Status: SHIPPED | OUTPATIENT
Start: 2019-06-25 | End: 2020-07-20 | Stop reason: SDUPTHER

## 2019-06-25 RX ORDER — TRAZODONE HYDROCHLORIDE 50 MG/1
25-100 TABLET ORAL NIGHTLY PRN
Qty: 90 TABLET | Refills: 3 | Status: SHIPPED | OUTPATIENT
Start: 2019-06-25 | End: 2020-07-20 | Stop reason: SDUPTHER

## 2019-06-25 NOTE — PROGRESS NOTES
Subjective:      Patient ID: Makayla Neal is a 54 y.o. female.    Chief Complaint: Annual Exam    Disclaimer:  This note is prepared using voice recognition software and as such is likely to have errors and has not been proof read. Please contact me for questions.     Patient's coming in today for prevention exam as well as.  Is needing to update and get a mammogram at [facility].  Does have a new granddaughter.  Has never done a colonoscopy.  Would be willing to do fecal testing.  Regularly takes vitamin D 2 weekly supplementation twice a week takes a daily Lasix 20 mg and takes Benicar HCTZ.  Occasional use trazodone for sleep.  Seems to do very well. Was previously seeing Dr. Hiwot Reardon .  Is supposed to do colonoscopy at but has not yet.  Would be willing to do the  fitkit has had shingles in the past willing to do shingles vaccine      Lab Results   Component Value Date    WBC 5.63 05/10/2018    HGB 12.1 05/10/2018    HCT 40.8 05/10/2018     05/10/2018    CHOL 235 (H) 05/10/2018    TRIG 127 05/10/2018    HDL 62 05/10/2018    ALT 28 05/10/2018    AST 39 05/10/2018     05/10/2018    K 3.7 05/10/2018     05/10/2018    CREATININE 0.9 05/10/2018    BUN 16 05/10/2018    CO2 29 05/10/2018    TSH 3.510 05/10/2018    INR 1.0 10/11/2016    HGBA1C 5.5 05/10/2018       X-Ray Chest PA And Lateral  Narrative: Findings: Heart size is normal.  There are no acute consolidations identified within the lungs.  There are no pleural effusions.  There is degenerative change in the thoracic spine.2 views  Impression:  No acute cardiopulmonary disease.    Electronically signed by: RENETTA LIAO MD  Date:     10/11/16  Time:    09:52         Review of Systems   Constitutional: Negative for activity change, chills, fatigue, fever and unexpected weight change.   HENT: Negative for ear pain, hearing loss, rhinorrhea and trouble swallowing.    Eyes: Negative for pain, discharge and visual disturbance.  "  Respiratory: Negative for cough, chest tightness, shortness of breath and wheezing.    Cardiovascular: Negative for chest pain, palpitations and leg swelling.   Gastrointestinal: Negative for abdominal pain, blood in stool, constipation, diarrhea, nausea and vomiting.   Endocrine: Negative for cold intolerance, heat intolerance, polydipsia and polyuria.   Genitourinary: Negative for difficulty urinating, dysuria, frequency, hematuria and menstrual problem.   Musculoskeletal: Negative for arthralgias, joint swelling, myalgias and neck pain.   Skin: Negative for color change and rash.   Neurological: Negative for dizziness, weakness and headaches.   Psychiatric/Behavioral: Negative for behavioral problems, confusion, dysphoric mood and sleep disturbance.     Objective:     Vitals:    06/25/19 0713   BP: 124/82   Pulse: 74   Temp: 97.3 °F (36.3 °C)   TempSrc: Tympanic   Weight: 88.3 kg (194 lb 10.7 oz)   Height: 5' 6" (1.676 m)     Physical Exam   Constitutional: She is oriented to person, place, and time. She appears well-developed and well-nourished.   HENT:   Head: Normocephalic and atraumatic.   Right Ear: External ear normal.   Left Ear: External ear normal.   Mouth/Throat: Oropharynx is clear and moist.   Eyes: EOM are normal.   Neck: Normal range of motion. Neck supple. No thyromegaly present.   Cardiovascular: Normal rate and regular rhythm. Exam reveals no gallop and no friction rub.   No murmur heard.  Pulmonary/Chest: Effort normal. No respiratory distress. She has no wheezes. She has no rales.   Abdominal: Soft. Bowel sounds are normal. She exhibits no distension. There is no tenderness. There is no rebound.   Musculoskeletal: Normal range of motion. She exhibits no edema.   Lymphadenopathy:     She has no cervical adenopathy.   Neurological: She is alert and oriented to person, place, and time.   Skin: Skin is warm and dry. No rash noted.   Psychiatric: She has a normal mood and affect. Her behavior is " normal. Judgment and thought content normal.   Vitals reviewed.    Assessment:     1. Routine general medical examination at a health care facility    2. Essential hypertension    3. Vitamin D deficiency disease    4. Insomnia, unspecified type    5. Encounter for screening mammogram for breast cancer    6. Colon cancer screening      Plan:   Makayla was seen today for annual exam.    Diagnoses and all orders for this visit:    Routine general medical examination at a health care facility-labs ordered discussed health maintenance issues  -     TSH; Future  -     T4, free; Future  -     Lipid panel; Future  -     Microalbumin/creatinine urine ratio  -     Hemoglobin A1c; Future  -     Insulin, random; Future  -     Comprehensive metabolic panel; Future  -     CBC auto differential; Future  -     Vitamin D; Future    Essential hypertension-refilled medications continue with Benicar HCT and Lasix  -     TSH; Future  -     T4, free; Future  -     Lipid panel; Future  -     Microalbumin/creatinine urine ratio  -     Hemoglobin A1c; Future  -     Insulin, random; Future  -     Comprehensive metabolic panel; Future  -     CBC auto differential; Future  -     Vitamin D; Future  -     olmesartan-hydrochlorothiazide (BENICAR HCT) 20-12.5 mg per tablet; Take 1 tablet by mouth once daily.  -     furosemide (LASIX) 20 MG tablet; Take 1 tablet (20 mg total) by mouth every morning.    Vitamin D deficiency disease  -     TSH; Future-currently on vitamin D2 83215 units twice weekly.  -     T4, free; Future  -     Lipid panel; Future  -     Microalbumin/creatinine urine ratio  -     Hemoglobin A1c; Future  -     Insulin, random; Future  -     Comprehensive metabolic panel; Future  -     CBC auto differential; Future  -     Vitamin D; Future  -     ergocalciferol (VITAMIN D2) 50,000 unit Cap; TAKE 1 CAPSULE BY MOUTH TWICE A WEEK.    Insomnia, unspecified type-benefitting from trazodone p.r.n.  -     TSH; Future  -     T4, free; Future  -      Lipid panel; Future  -     Microalbumin/creatinine urine ratio  -     Hemoglobin A1c; Future  -     Insulin, random; Future  -     Comprehensive metabolic panel; Future  -     CBC auto differential; Future  -     Vitamin D; Future    Encounter for screening mammogram for breast cancer scheduled to be done at [facility]  -     Mammo Digital Screening Bilat; Future  -     Mammo Digital Screening Bilat    Colon cancer screening-willing to do fecal testing  -     Fecal Immunochemical Test (iFOBT); Future    Other orders updating shingles vaccine return in 2 months for additional shots  -     (In Office Administered) Zoster Recombinant Vaccine  -     traZODone (DESYREL) 50 MG tablet; Take 0.5-2 tablets ( mg total) by mouth nightly as needed for Insomnia.            Follow up in about 1 year (around 6/25/2020) for physical with Dr GAO.    There are no Patient Instructions on file for this visit.

## 2019-06-26 ENCOUNTER — PATIENT MESSAGE (OUTPATIENT)
Dept: INTERNAL MEDICINE | Facility: CLINIC | Age: 54
End: 2019-06-26

## 2019-06-26 LAB
ALBUMIN/CREAT UR: NORMAL UG/MG (ref 0–30)
CREAT UR-MCNC: 20 MG/DL (ref 15–325)
ESTIMATED AVG GLUCOSE: 117 MG/DL (ref 68–131)
HBA1C MFR BLD HPLC: 5.7 % (ref 4–5.6)
INSULIN COLLECTION INTERVAL: NORMAL
INSULIN SERPL-ACNC: 5.7 UU/ML
MICROALBUMIN UR DL<=1MG/L-MCNC: <2.5 UG/ML

## 2019-06-26 NOTE — TELEPHONE ENCOUNTER
The 10-year ASCVD risk score (Qian SALGADO Jr., et al., 2013) is: 2.3%    Values used to calculate the score:      Age: 54 years      Sex: Female      Is Non- : No      Diabetic: No      Tobacco smoker: No      Systolic Blood Pressure: 124 mmHg      Is BP treated: Yes      HDL Cholesterol: 73 mg/dL      Total Cholesterol: 264 mg/dL

## 2019-07-02 ENCOUNTER — PATIENT OUTREACH (OUTPATIENT)
Dept: ADMINISTRATIVE | Facility: HOSPITAL | Age: 54
End: 2019-07-02

## 2019-08-22 DIAGNOSIS — E55.9 VITAMIN D DEFICIENCY DISEASE: ICD-10-CM

## 2019-08-22 RX ORDER — ERGOCALCIFEROL 1.25 MG/1
CAPSULE ORAL
Qty: 24 CAPSULE | Refills: 0 | Status: SHIPPED | OUTPATIENT
Start: 2019-08-22 | End: 2020-07-20 | Stop reason: SDUPTHER

## 2020-01-23 ENCOUNTER — PATIENT OUTREACH (OUTPATIENT)
Dept: ADMINISTRATIVE | Facility: HOSPITAL | Age: 55
End: 2020-01-23

## 2020-02-11 ENCOUNTER — PATIENT OUTREACH (OUTPATIENT)
Dept: ADMINISTRATIVE | Facility: HOSPITAL | Age: 55
End: 2020-02-11

## 2020-07-20 DIAGNOSIS — E55.9 VITAMIN D DEFICIENCY DISEASE: ICD-10-CM

## 2020-07-20 DIAGNOSIS — I10 ESSENTIAL HYPERTENSION: ICD-10-CM

## 2020-07-20 RX ORDER — FUROSEMIDE 20 MG/1
20 TABLET ORAL EVERY MORNING
Qty: 90 TABLET | Refills: 0 | Status: SHIPPED | OUTPATIENT
Start: 2020-07-20 | End: 2020-08-11 | Stop reason: SDUPTHER

## 2020-07-20 RX ORDER — OLMESARTAN MEDOXOMIL AND HYDROCHLOROTHIAZIDE 20/12.5 20; 12.5 MG/1; MG/1
1 TABLET ORAL DAILY
Qty: 90 TABLET | Refills: 0 | Status: SHIPPED | OUTPATIENT
Start: 2020-07-20 | End: 2020-08-11 | Stop reason: SDUPTHER

## 2020-07-20 RX ORDER — TRAZODONE HYDROCHLORIDE 50 MG/1
25-100 TABLET ORAL NIGHTLY PRN
Qty: 90 TABLET | Refills: 0 | Status: SHIPPED | OUTPATIENT
Start: 2020-07-20 | End: 2020-08-13

## 2020-07-20 RX ORDER — ERGOCALCIFEROL 1.25 MG/1
CAPSULE ORAL
Qty: 24 CAPSULE | Refills: 0 | Status: SHIPPED | OUTPATIENT
Start: 2020-07-20 | End: 2020-08-11

## 2020-08-11 ENCOUNTER — OFFICE VISIT (OUTPATIENT)
Dept: PRIMARY CARE CLINIC | Facility: CLINIC | Age: 55
End: 2020-08-11
Payer: COMMERCIAL

## 2020-08-11 ENCOUNTER — HOSPITAL ENCOUNTER (OUTPATIENT)
Dept: RADIOLOGY | Facility: HOSPITAL | Age: 55
Discharge: HOME | End: 2020-08-11
Attending: FAMILY MEDICINE
Payer: COMMERCIAL

## 2020-08-11 VITALS
BODY MASS INDEX: 30.25 KG/M2 | SYSTOLIC BLOOD PRESSURE: 134 MMHG | DIASTOLIC BLOOD PRESSURE: 84 MMHG | HEIGHT: 66 IN | WEIGHT: 188.25 LBS | TEMPERATURE: 99 F | HEART RATE: 76 BPM

## 2020-08-11 DIAGNOSIS — R79.82 ELEVATED C-REACTIVE PROTEIN (CRP): ICD-10-CM

## 2020-08-11 DIAGNOSIS — G47.00 INSOMNIA, UNSPECIFIED TYPE: ICD-10-CM

## 2020-08-11 DIAGNOSIS — Z00.00 ROUTINE GENERAL MEDICAL EXAMINATION AT A HEALTH CARE FACILITY: Primary | ICD-10-CM

## 2020-08-11 DIAGNOSIS — I10 ESSENTIAL HYPERTENSION: ICD-10-CM

## 2020-08-11 DIAGNOSIS — Q67.8 CHEST WALL ASYMMETRY: ICD-10-CM

## 2020-08-11 DIAGNOSIS — Z12.11 COLON CANCER SCREENING: ICD-10-CM

## 2020-08-11 DIAGNOSIS — E28.319 EARLY MENOPAUSE: ICD-10-CM

## 2020-08-11 DIAGNOSIS — E55.9 VITAMIN D DEFICIENCY DISEASE: ICD-10-CM

## 2020-08-11 PROCEDURE — 99396 PR PREVENTIVE VISIT,EST,40-64: ICD-10-PCS | Mod: S$GLB,,, | Performed by: FAMILY MEDICINE

## 2020-08-11 PROCEDURE — 71046 XR CHEST PA AND LATERAL: ICD-10-PCS | Mod: 26,,, | Performed by: RADIOLOGY

## 2020-08-11 PROCEDURE — 99396 PREV VISIT EST AGE 40-64: CPT | Mod: S$GLB,,, | Performed by: FAMILY MEDICINE

## 2020-08-11 PROCEDURE — 71046 X-RAY EXAM CHEST 2 VIEWS: CPT | Mod: TC

## 2020-08-11 PROCEDURE — 71046 X-RAY EXAM CHEST 2 VIEWS: CPT | Mod: 26,,, | Performed by: RADIOLOGY

## 2020-08-11 PROCEDURE — 99999 PR PBB SHADOW E&M-EST. PATIENT-LVL III: ICD-10-PCS | Mod: PBBFAC,,, | Performed by: FAMILY MEDICINE

## 2020-08-11 PROCEDURE — 99999 PR PBB SHADOW E&M-EST. PATIENT-LVL III: CPT | Mod: PBBFAC,,, | Performed by: FAMILY MEDICINE

## 2020-08-11 PROCEDURE — 82043 UR ALBUMIN QUANTITATIVE: CPT

## 2020-08-11 RX ORDER — FUROSEMIDE 20 MG/1
20 TABLET ORAL EVERY MORNING
Qty: 90 TABLET | Refills: 3 | Status: SHIPPED | OUTPATIENT
Start: 2020-08-11 | End: 2021-07-28 | Stop reason: SDUPTHER

## 2020-08-11 RX ORDER — ERGOCALCIFEROL 1.25 MG/1
CAPSULE ORAL
Qty: 24 CAPSULE | Refills: 3 | Status: SHIPPED | OUTPATIENT
Start: 2020-08-11 | End: 2021-07-28 | Stop reason: SDUPTHER

## 2020-08-11 RX ORDER — OLMESARTAN MEDOXOMIL AND HYDROCHLOROTHIAZIDE 20/12.5 20; 12.5 MG/1; MG/1
1 TABLET ORAL DAILY
Qty: 90 TABLET | Refills: 3 | Status: SHIPPED | OUTPATIENT
Start: 2020-08-11 | End: 2021-07-28 | Stop reason: SDUPTHER

## 2020-08-11 NOTE — PROGRESS NOTES
Subjective:      Patient ID: Makayla Neal is a 55 y.o. female.    Chief Complaint: Annual Exam    Disclaimer:  This note is prepared using voice recognition software and as such is likely to have errors and has not been proof read. Please contact me for questions.     Patient's coming in today for prevention exam . Working from home.  Lives at [facility].    Did start a new routine  And getting up early. Starting to do walk and run and doing that around 630am. Does fast walk and run intervals.  Cautious with her back after surgeon.  Weight is down feeling good.  Does have an area on her right frontal ribcage that she has noticed had a more of a protuberance.  Not really tender but is going for mammogram tomorrow.  Willing to do an x-ray tomorrow as well.    Wt Readings from Last 10 Encounters:  08/11/20 : 85.4 kg (188 lb 4.4 oz)  06/25/19 : 88.3 kg (194 lb 10.7 oz)  05/10/18 : 93.7 kg (206 lb 9.1 oz)  05/09/17 : 90 kg (198 lb 6.6 oz)  01/19/17 : 88.9 kg (196 lb)  10/11/16 : 87.9 kg (193 lb 12.6 oz)  02/17/16 : 88.8 kg (195 lb 12.3 oz)  12/22/15 : 87.2 kg (192 lb 3.9 oz)  02/09/15 : 88.9 kg (196 lb)  12/11/13 : 88.9 kg (196 lb)    Is setting up with gynecology Dr. Jiménez soon as well.  Has never done a colonoscopy.  Willing to do fit kit.  Blood pressure is controlled with  takes a daily Lasix 20 mg and takes Benicar HCTZ.    Declines doing additional shingles vaccine as she got ill for about 3 days.    Has never done a colonoscopy.  Would be willing to do fecal testing.  Regularly takes vitamin D 2 weekly supplementation twice a week      Occasional use trazodone for sleep.  Seems to do very well. Was previously seeing Dr. Hiwot Reardon for gyn.     8/10/2020  6:45 AM CDT - Filed by Patient      What is your primary reason for visit? Other/Annual         Ohs Patient-Entered Ros        Question    8/10/2020  6:45 AM CDT - Filed by Patient      Have you experienced any of the following:    Change in  activity? No   Unexpected weight change? No   Neck pain? No   Hearing loss? No   Runny nose? No   Trouble swallowing? No   Eye discharge? No   Changes in vision? No   Chest tightness? No   Wheezing? No   Chest pain? No   Heart beating fast or racing? No   Blood in stool? No   Constipation? Yes   Vomiting? No   Diarrhea? No   Drinking much more than usual? No   Urinating much more than usual? No   Difficulty urinating? No   Blood in the urine? No   Menstrual problem? No   Painful urination? No   Joint swelling? No   Joint pain? No   Headaches? Yes   Weakness? No   Confusion? No   Feeling depressed? Yes         Ohs Peq Sdoh        Question    8/10/2020  6:48 AM CDT - Filed by Patient      On average, how many days per week do you engage in moderate to strenuous exercise (like walking fast, running, jogging, dancing, swimming, biking, or other activities that cause a light or heavy sweat)? 6 days   On average, how many minutes do you engage in exercise at this level? 20 minutes   Do you feel stress - tense, restless, nervous, or anxious, or unable to sleep at night because your mind is troubled all the time - these days? Rather much   Do you belong to any clubs or organizations such as Y[employer] groups, unions, fraternal or athletic groups, or school groups? No   How often do you attend meetings of the clubs or organizations you belong to?      In a typical week, how many times do you talk on the telephone with family, friends, or neighbors? More than three times a week   How often do you get together with friends or relatives? Once a week   Are you now , , , , never  or living with a partner?    How hard is it for you to pay for the very basics like food, housing, medical care, and heating? Not hard at all   Within the past 12 months, you worried that your food would run out before you got money to buy more. Never true   Within the past 12 months, the food you bought just  didn't last and you didn't have money to get more. Never true   In the past 12 months, has lack of transportation kept you from medical appointments or from getting medications? No   In the past 12 months, has lack of transportation kept you from meetings, work, or getting things needed for daily living? No   How often do you have a drink containing alcohol? 2-3 times a week   How many drinks containing alcohol do you have on a typical day when you are drinking? 1 or 2   How often do you have six or more drinks on one occasion? Never                                 Patient Hx                                  Patient Medical History     Submitted on 8/10/2020 6:51 AM                     Surgical History       Response Date Comments     Adenoidectomy No        No       Prostate surgery No       Appendectomy Yes       Eye surgery No       Small intestine surgery No       Brain surgery No       Fracture surgery No       Spine surgery Yes 2016      Breast surgery No       Hernia repair No       Tonsillectomy Yes       CABG No       Hysterectomy No       Tubal ligation No       Cholecystectomy Yes 2012      Joint replacement No       Valve replacement No       Colon surgery No       Kidney transplant No       Vasectomy No       Cosmetic surgery No       Liver Transplant No                        Family History       Relation Name Comments     COPD Mother Jacy Young          Diabetes Father Taiwo Young          Heart disease Father Taiwo Young   Heart Attack before age 55       Heart disease Sister Ginna Young   Heart Attack before age 55       Hyperlipidemia Father Taiwo Young          Hypertension Mother Jacy Young          Stroke Mother Jacy Young   Mild stroke                  Social History               Response Currently on File                    Alcohol Use Yes Yes   Alcohol/Week 2.0 standard drinks    Drinks/Week 2 Glasses of wine, 0 Cans of beer, 0 Shots of liquor, 0 Drinks  containing 0.5 oz of alcohol    Comments                        Sexual Activity Yes Yes   Partners Male    Birth Control / Protection Post-menopausal    Comments                        Drug Use Never No   Per Week     Types     Comments                        Tobacco Use Never Smoker Never Smoker   Types     Packs/day     Years     Start Date     Quit Date     Comments                     Lab Results   Component Value Date    WBC 4.70 06/25/2019    HGB 12.6 06/25/2019    HCT 40.1 06/25/2019     06/25/2019    CHOL 264 (H) 06/25/2019    TRIG 88 06/25/2019    HDL 73 06/25/2019    ALT 23 06/25/2019    AST 34 06/25/2019     06/25/2019    K 3.9 06/25/2019     06/25/2019    CREATININE 0.9 06/25/2019    BUN 20 06/25/2019    CO2 30 (H) 06/25/2019    TSH 3.378 06/25/2019    INR 1.0 10/11/2016    HGBA1C 5.7 (H) 06/25/2019       X-Ray Chest PA And Lateral  Narrative: Findings: Heart size is normal.  There are no acute consolidations identified within the lungs.  There are no pleural effusions.  There is degenerative change in the thoracic spine.2 views  Impression:  No acute cardiopulmonary disease.    Electronically signed by: RENETTA LIAO MD  Date:     10/11/16  Time:    09:52         Review of Systems   Constitutional: Negative for activity change and unexpected weight change.   HENT: Negative for hearing loss, rhinorrhea and trouble swallowing.    Eyes: Negative for discharge and visual disturbance.   Respiratory: Negative for chest tightness and wheezing.    Cardiovascular: Negative for chest pain and palpitations.   Gastrointestinal: Positive for constipation. Negative for blood in stool, diarrhea and vomiting.   Endocrine: Negative for polydipsia and polyuria.   Genitourinary: Negative for difficulty urinating, dysuria, hematuria and menstrual problem.   Musculoskeletal: Negative for arthralgias, joint swelling and neck pain.   Neurological: Positive for headaches. Negative for weakness.  "  Psychiatric/Behavioral: Positive for dysphoric mood. Negative for confusion.     Objective:     Vitals:    08/11/20 0750   BP: 134/84   Pulse: 76   Temp: 99 °F (37.2 °C)   Weight: 85.4 kg (188 lb 4.4 oz)   Height: 5' 6" (1.676 m)     Physical Exam  Vitals signs reviewed.   Constitutional:       Appearance: Normal appearance. She is well-developed. She is obese.   HENT:      Head: Normocephalic and atraumatic.      Right Ear: Tympanic membrane and external ear normal.      Left Ear: Tympanic membrane and external ear normal.      Nose: Nose normal.      Mouth/Throat:      Mouth: Mucous membranes are moist.      Pharynx: Oropharynx is clear.   Eyes:      Conjunctiva/sclera: Conjunctivae normal.      Pupils: Pupils are equal, round, and reactive to light.   Neck:      Musculoskeletal: Normal range of motion and neck supple.      Thyroid: No thyromegaly.   Cardiovascular:      Rate and Rhythm: Normal rate and regular rhythm.      Heart sounds: No murmur. No friction rub. No gallop.    Pulmonary:      Effort: Pulmonary effort is normal. No respiratory distress.      Breath sounds: No wheezing or rales.   Chest:      Chest wall: Deformity present. No tenderness.      Breasts: Breasts are symmetrical.       Abdominal:      General: Bowel sounds are normal. There is no distension.      Palpations: Abdomen is soft.      Tenderness: There is no abdominal tenderness. There is no rebound.   Musculoskeletal: Normal range of motion.   Lymphadenopathy:      Cervical: No cervical adenopathy.   Skin:     General: Skin is warm and dry.      Findings: No rash.   Neurological:      Mental Status: She is alert and oriented to person, place, and time.   Psychiatric:         Attention and Perception: Attention and perception normal.         Mood and Affect: Mood and affect normal.         Speech: Speech normal.         Behavior: Behavior normal.         Thought Content: Thought content normal.         Cognition and Memory: Cognition and " memory normal.         Judgment: Judgment normal.       Assessment:     1. Routine general medical examination at a health care facility    2. Essential hypertension    3. Insomnia, unspecified type    4. Vitamin D deficiency disease    5. Early menopause    6. Elevated C-reactive protein (CRP)    7. Colon cancer screening    8. Chest wall asymmetry      Plan:   Makayla was seen today for annual exam.    Diagnoses and all orders for this visit:    Routine general medical examination at a health care facility-labs ordered today discussed health maintenance desire to do high sensitivity CRP again.  Did do CT calcium scoring back in 2016 and was 0  -     TSH; Future  -     T4, free; Future  -     Lipid Panel; Future  -     Microalbumin/creatinine urine ratio  -     Hemoglobin A1C; Future  -     Insulin, random; Future  -     Comprehensive metabolic panel; Future  -     CBC auto differential; Future  -     Vitamin D; Future  -     High sensitivity CRP (Cardiac CRP); Future    Essential hypertension controlled lab work today refill medication  -     TSH; Future  -     T4, free; Future  -     Lipid Panel; Future  -     Microalbumin/creatinine urine ratio  -     Hemoglobin A1C; Future  -     Insulin, random; Future  -     Comprehensive metabolic panel; Future  -     CBC auto differential; Future  -     Vitamin D; Future  -     High sensitivity CRP (Cardiac CRP); Future  -     furosemide (LASIX) 20 MG tablet; Take 1 tablet (20 mg total) by mouth every morning.  -     olmesartan-hydrochlorothiazide (BENICAR HCT) 20-12.5 mg per tablet; Take 1 tablet by mouth once daily.    Insomnia, unspecified type controlled with the use of trazodone  -     TSH; Future  -     T4, free; Future  -     Lipid Panel; Future  -     Microalbumin/creatinine urine ratio  -     Hemoglobin A1C; Future  -     Insulin, random; Future  -     Comprehensive metabolic panel; Future  -     CBC auto differential; Future  -     Vitamin D; Future  -     High  sensitivity CRP (Cardiac CRP); Future    Vitamin D deficiency disease doing well with twice weekly vitamin-D supplementation  -     TSH; Future  -     T4, free; Future  -     Lipid Panel; Future  -     Microalbumin/creatinine urine ratio  -     Hemoglobin A1C; Future  -     Insulin, random; Future  -     Comprehensive metabolic panel; Future  -     CBC auto differential; Future  -     Vitamin D; Future  -     High sensitivity CRP (Cardiac CRP); Future  -     ergocalciferol (VITAMIN D2) 50,000 unit Cap; TAKE 1 CAPSULE BY MOUTH TWICE A WEEK.    Early menopause-checking lab work today sing gynecology next month-     TSH; Future  -     T4, free; Future  -     Lipid Panel; Future  -     Microalbumin/creatinine urine ratio  -     Hemoglobin A1C; Future  -     Insulin, random; Future  -     Comprehensive metabolic panel; Future  -     CBC auto differential; Future  -     Vitamin D; Future  -     High sensitivity CRP (Cardiac CRP); Future    Elevated C-reactive protein (CRP) scheduled today normal CT calcium scoring ends 2016 but high family history  -     High sensitivity CRP (Cardiac CRP); Future    Colon cancer screening  -     Fecal Immunochemical Test (iFOBT); Future    Chest wall asymmetry performed chest x-ray tomorrow  -     X-Ray Chest PA And Lateral; Future            Follow up in about 1 year (around 8/11/2021).    There are no Patient Instructions on file for this visit.

## 2020-08-12 LAB
ALBUMIN/CREAT UR: NORMAL UG/MG (ref 0–30)
CREAT UR-MCNC: 20 MG/DL (ref 15–325)
MICROALBUMIN UR DL<=1MG/L-MCNC: <2.5 UG/ML

## 2020-09-15 RX ORDER — TRAZODONE HYDROCHLORIDE 50 MG/1
25-100 TABLET ORAL NIGHTLY PRN
Qty: 60 TABLET | Refills: 1 | Status: SHIPPED | OUTPATIENT
Start: 2020-09-15 | End: 2020-10-04

## 2020-10-05 ENCOUNTER — PATIENT MESSAGE (OUTPATIENT)
Dept: ADMINISTRATIVE | Facility: HOSPITAL | Age: 55
End: 2020-10-05

## 2020-10-30 ENCOUNTER — PATIENT MESSAGE (OUTPATIENT)
Dept: ADMINISTRATIVE | Facility: HOSPITAL | Age: 55
End: 2020-10-30

## 2021-01-21 ENCOUNTER — PATIENT MESSAGE (OUTPATIENT)
Dept: PRIMARY CARE CLINIC | Facility: CLINIC | Age: 56
End: 2021-01-21

## 2021-03-02 ENCOUNTER — PATIENT OUTREACH (OUTPATIENT)
Dept: ADMINISTRATIVE | Facility: HOSPITAL | Age: 56
End: 2021-03-02

## 2021-04-28 ENCOUNTER — PATIENT MESSAGE (OUTPATIENT)
Dept: RESEARCH | Facility: HOSPITAL | Age: 56
End: 2021-04-28

## 2021-06-29 ENCOUNTER — PATIENT OUTREACH (OUTPATIENT)
Dept: ADMINISTRATIVE | Facility: HOSPITAL | Age: 56
End: 2021-06-29

## 2021-06-29 DIAGNOSIS — Z12.31 ENCOUNTER FOR SCREENING MAMMOGRAM FOR MALIGNANT NEOPLASM OF BREAST: Primary | ICD-10-CM

## 2021-07-28 ENCOUNTER — OFFICE VISIT (OUTPATIENT)
Dept: PRIMARY CARE CLINIC | Facility: CLINIC | Age: 56
End: 2021-07-28
Payer: COMMERCIAL

## 2021-07-28 VITALS
WEIGHT: 186.31 LBS | SYSTOLIC BLOOD PRESSURE: 122 MMHG | DIASTOLIC BLOOD PRESSURE: 82 MMHG | HEART RATE: 74 BPM | BODY MASS INDEX: 29.94 KG/M2 | HEIGHT: 66 IN | TEMPERATURE: 98 F

## 2021-07-28 DIAGNOSIS — Z12.11 COLON CANCER SCREENING: ICD-10-CM

## 2021-07-28 DIAGNOSIS — Z00.00 ROUTINE GENERAL MEDICAL EXAMINATION AT A HEALTH CARE FACILITY: Primary | ICD-10-CM

## 2021-07-28 DIAGNOSIS — G47.00 INSOMNIA, UNSPECIFIED TYPE: ICD-10-CM

## 2021-07-28 DIAGNOSIS — E55.9 VITAMIN D DEFICIENCY DISEASE: ICD-10-CM

## 2021-07-28 DIAGNOSIS — I10 ESSENTIAL HYPERTENSION: ICD-10-CM

## 2021-07-28 PROCEDURE — 99999 PR PBB SHADOW E&M-EST. PATIENT-LVL III: ICD-10-PCS | Mod: PBBFAC,,, | Performed by: FAMILY MEDICINE

## 2021-07-28 PROCEDURE — 99396 PR PREVENTIVE VISIT,EST,40-64: ICD-10-PCS | Mod: S$GLB,,, | Performed by: FAMILY MEDICINE

## 2021-07-28 PROCEDURE — 99396 PREV VISIT EST AGE 40-64: CPT | Mod: S$GLB,,, | Performed by: FAMILY MEDICINE

## 2021-07-28 PROCEDURE — 99999 PR PBB SHADOW E&M-EST. PATIENT-LVL III: CPT | Mod: PBBFAC,,, | Performed by: FAMILY MEDICINE

## 2021-07-28 RX ORDER — FUROSEMIDE 20 MG/1
20 TABLET ORAL EVERY MORNING
Qty: 90 TABLET | Refills: 3 | Status: SHIPPED | OUTPATIENT
Start: 2021-07-28 | End: 2022-08-12

## 2021-07-28 RX ORDER — OLMESARTAN MEDOXOMIL AND HYDROCHLOROTHIAZIDE 20/12.5 20; 12.5 MG/1; MG/1
1 TABLET ORAL DAILY
Qty: 90 TABLET | Refills: 3 | Status: SHIPPED | OUTPATIENT
Start: 2021-07-28 | End: 2022-08-12

## 2021-07-28 RX ORDER — ERGOCALCIFEROL 1.25 MG/1
CAPSULE ORAL
Qty: 24 CAPSULE | Refills: 3 | Status: SHIPPED | OUTPATIENT
Start: 2021-07-28 | End: 2021-08-10

## 2021-07-28 RX ORDER — TRAZODONE HYDROCHLORIDE 50 MG/1
25-100 TABLET ORAL NIGHTLY PRN
Qty: 180 TABLET | Refills: 3 | Status: SHIPPED | OUTPATIENT
Start: 2021-07-28 | End: 2022-08-12

## 2021-08-05 ENCOUNTER — HOSPITAL ENCOUNTER (OUTPATIENT)
Dept: RADIOLOGY | Facility: HOSPITAL | Age: 56
Discharge: HOME | End: 2021-08-05
Attending: FAMILY MEDICINE
Payer: COMMERCIAL

## 2021-08-05 VITALS — BODY MASS INDEX: 29.76 KG/M2 | WEIGHT: 185.19 LBS | HEIGHT: 66 IN

## 2021-08-05 DIAGNOSIS — Z12.31 ENCOUNTER FOR SCREENING MAMMOGRAM FOR MALIGNANT NEOPLASM OF BREAST: ICD-10-CM

## 2021-08-05 PROCEDURE — 77067 SCR MAMMO BI INCL CAD: CPT | Mod: 26,,, | Performed by: RADIOLOGY

## 2021-08-05 PROCEDURE — 77063 MAMMO DIGITAL SCREENING BILAT WITH TOMO: ICD-10-PCS | Mod: 26,,, | Performed by: RADIOLOGY

## 2021-08-05 PROCEDURE — 77063 BREAST TOMOSYNTHESIS BI: CPT | Mod: 26,,, | Performed by: RADIOLOGY

## 2021-08-05 PROCEDURE — 77067 MAMMO DIGITAL SCREENING BILAT WITH TOMO: ICD-10-PCS | Mod: 26,,, | Performed by: RADIOLOGY

## 2021-08-05 PROCEDURE — 77067 SCR MAMMO BI INCL CAD: CPT | Mod: TC

## 2021-08-07 LAB
25(OH)D3 SERPL-MCNC: 133 NG/ML (ref 30–100)
ALBUMIN SERPL-MCNC: 4.3 G/DL (ref 3.6–5.1)
ALBUMIN/CREAT UR: 2 MCG/MG CREAT
ALBUMIN/GLOB SERPL: 2 (CALC) (ref 1–2.5)
ALP SERPL-CCNC: 69 U/L (ref 37–153)
ALT SERPL-CCNC: 11 U/L (ref 6–29)
AST SERPL-CCNC: 15 U/L (ref 10–35)
BASOPHILS # BLD AUTO: 51 CELLS/UL (ref 0–200)
BASOPHILS NFR BLD AUTO: 1.3 %
BILIRUB SERPL-MCNC: 0.4 MG/DL (ref 0.2–1.2)
BUN SERPL-MCNC: 16 MG/DL (ref 7–25)
BUN/CREAT SERPL: NORMAL (CALC) (ref 6–22)
CALCIUM SERPL-MCNC: 9.6 MG/DL (ref 8.6–10.4)
CHLORIDE SERPL-SCNC: 104 MMOL/L (ref 98–110)
CHOLEST SERPL-MCNC: 236 MG/DL
CHOLEST/HDLC SERPL: 2.7 (CALC)
CO2 SERPL-SCNC: 30 MMOL/L (ref 20–32)
CREAT SERPL-MCNC: 0.85 MG/DL (ref 0.5–1.05)
CREAT UR-MCNC: 88 MG/DL (ref 20–275)
EOSINOPHIL # BLD AUTO: 78 CELLS/UL (ref 15–500)
EOSINOPHIL NFR BLD AUTO: 2 %
ERYTHROCYTE [DISTWIDTH] IN BLOOD BY AUTOMATED COUNT: 13.3 % (ref 11–15)
GLOBULIN SER CALC-MCNC: 2.1 G/DL (CALC) (ref 1.9–3.7)
GLUCOSE SERPL-MCNC: 98 MG/DL (ref 65–99)
HBA1C MFR BLD: 5.4 % OF TOTAL HGB
HCT VFR BLD AUTO: 38.2 % (ref 35–45)
HDLC SERPL-MCNC: 88 MG/DL
HGB BLD-MCNC: 12.7 G/DL (ref 11.7–15.5)
INSULIN SERPL-ACNC: 4.7 UIU/ML
LDLC SERPL CALC-MCNC: 133 MG/DL (CALC)
LYMPHOCYTES # BLD AUTO: 1318 CELLS/UL (ref 850–3900)
LYMPHOCYTES NFR BLD AUTO: 33.8 %
MCH RBC QN AUTO: 30.4 PG (ref 27–33)
MCHC RBC AUTO-ENTMCNC: 33.2 G/DL (ref 32–36)
MCV RBC AUTO: 91.4 FL (ref 80–100)
MICROALBUMIN UR-MCNC: 0.2 MG/DL
MONOCYTES # BLD AUTO: 347 CELLS/UL (ref 200–950)
MONOCYTES NFR BLD AUTO: 8.9 %
NEUTROPHILS # BLD AUTO: 2106 CELLS/UL (ref 1500–7800)
NEUTROPHILS NFR BLD AUTO: 54 %
NONHDLC SERPL-MCNC: 148 MG/DL (CALC)
PLATELET # BLD AUTO: 223 THOUSAND/UL (ref 140–400)
PMV BLD REES-ECKER: 11.2 FL (ref 7.5–12.5)
POTASSIUM SERPL-SCNC: 4.9 MMOL/L (ref 3.5–5.3)
PROT SERPL-MCNC: 6.4 G/DL (ref 6.1–8.1)
RBC # BLD AUTO: 4.18 MILLION/UL (ref 3.8–5.1)
SODIUM SERPL-SCNC: 143 MMOL/L (ref 135–146)
T4 FREE SERPL-MCNC: 1 NG/DL (ref 0.8–1.8)
TRIGL SERPL-MCNC: 61 MG/DL
TSH SERPL-ACNC: 4.16 MIU/L (ref 0.4–4.5)
WBC # BLD AUTO: 3.9 THOUSAND/UL (ref 3.8–10.8)

## 2021-12-13 ENCOUNTER — TELEPHONE (OUTPATIENT)
Dept: OBSTETRICS AND GYNECOLOGY | Facility: CLINIC | Age: 56
End: 2021-12-13
Payer: COMMERCIAL

## 2022-04-07 ENCOUNTER — OFFICE VISIT (OUTPATIENT)
Dept: DERMATOLOGY | Facility: CLINIC | Age: 57
End: 2022-04-07
Payer: COMMERCIAL

## 2022-04-07 ENCOUNTER — PATIENT OUTREACH (OUTPATIENT)
Dept: ADMINISTRATIVE | Facility: OTHER | Age: 57
End: 2022-04-07
Payer: COMMERCIAL

## 2022-04-07 DIAGNOSIS — R22.9 SUBCUTANEOUS NODULE: Primary | ICD-10-CM

## 2022-04-07 PROCEDURE — 99202 PR OFFICE/OUTPT VISIT, NEW, LEVL II, 15-29 MIN: ICD-10-PCS | Mod: S$GLB,,, | Performed by: STUDENT IN AN ORGANIZED HEALTH CARE EDUCATION/TRAINING PROGRAM

## 2022-04-07 PROCEDURE — 99202 OFFICE O/P NEW SF 15 MIN: CPT | Mod: S$GLB,,, | Performed by: STUDENT IN AN ORGANIZED HEALTH CARE EDUCATION/TRAINING PROGRAM

## 2022-04-07 PROCEDURE — 99999 PR PBB SHADOW E&M-EST. PATIENT-LVL III: CPT | Mod: PBBFAC,,, | Performed by: STUDENT IN AN ORGANIZED HEALTH CARE EDUCATION/TRAINING PROGRAM

## 2022-04-07 PROCEDURE — 99999 PR PBB SHADOW E&M-EST. PATIENT-LVL III: ICD-10-PCS | Mod: PBBFAC,,, | Performed by: STUDENT IN AN ORGANIZED HEALTH CARE EDUCATION/TRAINING PROGRAM

## 2022-04-07 NOTE — PROGRESS NOTES
Subjective:       Patient ID:  Makayla Neal is a 57 y.o. female who presents for   Chief Complaint   Patient presents with    Cyst     History of Present Illness: The patient presents with chief complaint of cyst/nodule.  Location: left scalp  Duration: several months  Signs/Symptoms: growing nodule on the scalp, gets hit when combing hair. Noticeable growth  Prior treatments: none        Review of Systems   Constitutional: Negative for fever and chills.   Skin: Negative for itching, rash and dry skin.        Objective:    Physical Exam   Constitutional: She appears well-developed and well-nourished. No distress.   Neurological: She is alert and oriented to person, place, and time. She is not disoriented.   Psychiatric: She has a normal mood and affect.   Skin:   Areas Examined (abnormalities noted in diagram):   Scalp / Hair Palpated and Inspected  Head / Face Inspection Performed  Neck Inspection Performed              Diagram Legend     Erythematous scaling macule/papule c/w actinic keratosis       Vascular papule c/w angioma      Pigmented verrucoid papule/plaque c/w seborrheic keratosis      Yellow umbilicated papule c/w sebaceous hyperplasia      Irregularly shaped tan macule c/w lentigo     1-2 mm smooth white papules consistent with Milia      Movable subcutaneous cyst with punctum c/w epidermal inclusion cyst      Subcutaneous movable cyst c/w pilar cyst      Firm pink to brown papule c/w dermatofibroma      Pedunculated fleshy papule(s) c/w skin tag(s)      Evenly pigmented macule c/w junctional nevus     Mildly variegated pigmented, slightly irregular-bordered macule c/w mildly atypical nevus      Flesh colored to evenly pigmented papule c/w intradermal nevus       Pink pearly papule/plaque c/w basal cell carcinoma      Erythematous hyperkeratotic cursted plaque c/w SCC      Surgical scar with no sign of skin cancer recurrence      Open and closed comedones      Inflammatory papules and pustules       Verrucoid papule consistent consistent with wart     Erythematous eczematous patches and plaques     Dystrophic onycholytic nail with subungual debris c/w onychomycosis     Umbilicated papule    Erythematous-base heme-crusted tan verrucoid plaque consistent with inflamed seborrheic keratosis     Erythematous Silvery Scaling Plaque c/w Psoriasis     See annotation      Assessment / Plan:        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   Reassurance given to patient.   Discussed treatment options - excision vs observation. Cysts may recur with excision. Given size and location, patient wants removal; will schedule               Follow up if symptoms worsen or fail to improve.

## 2022-05-02 ENCOUNTER — PATIENT MESSAGE (OUTPATIENT)
Dept: ADMINISTRATIVE | Facility: HOSPITAL | Age: 57
End: 2022-05-02
Payer: COMMERCIAL

## 2022-05-05 ENCOUNTER — OFFICE VISIT (OUTPATIENT)
Dept: DERMATOLOGY | Facility: CLINIC | Age: 57
End: 2022-05-05
Payer: COMMERCIAL

## 2022-05-05 DIAGNOSIS — R22.9 SUBCUTANEOUS NODULE: Primary | ICD-10-CM

## 2022-05-05 PROCEDURE — 11421 PR EXC SKIN BENIG 0.6-1 CM REMAINDR BODY: ICD-10-PCS | Mod: S$GLB,,, | Performed by: STUDENT IN AN ORGANIZED HEALTH CARE EDUCATION/TRAINING PROGRAM

## 2022-05-05 PROCEDURE — 11421 EXC H-F-NK-SP B9+MARG 0.6-1: CPT | Mod: S$GLB,,, | Performed by: STUDENT IN AN ORGANIZED HEALTH CARE EDUCATION/TRAINING PROGRAM

## 2022-05-05 PROCEDURE — 88304 PR  SURG PATH,LEVEL III: ICD-10-PCS | Mod: 26,,, | Performed by: PATHOLOGY

## 2022-05-05 PROCEDURE — 88304 TISSUE EXAM BY PATHOLOGIST: CPT | Mod: 26,,, | Performed by: PATHOLOGY

## 2022-05-05 PROCEDURE — 88304 TISSUE EXAM BY PATHOLOGIST: CPT | Performed by: PATHOLOGY

## 2022-05-05 PROCEDURE — 99499 NO LOS: ICD-10-PCS | Mod: S$GLB,,, | Performed by: STUDENT IN AN ORGANIZED HEALTH CARE EDUCATION/TRAINING PROGRAM

## 2022-05-05 PROCEDURE — 99999 PR PBB SHADOW E&M-EST. PATIENT-LVL II: CPT | Mod: PBBFAC,,, | Performed by: STUDENT IN AN ORGANIZED HEALTH CARE EDUCATION/TRAINING PROGRAM

## 2022-05-05 PROCEDURE — 99999 PR PBB SHADOW E&M-EST. PATIENT-LVL II: ICD-10-PCS | Mod: PBBFAC,,, | Performed by: STUDENT IN AN ORGANIZED HEALTH CARE EDUCATION/TRAINING PROGRAM

## 2022-05-05 PROCEDURE — 99499 UNLISTED E&M SERVICE: CPT | Mod: S$GLB,,, | Performed by: STUDENT IN AN ORGANIZED HEALTH CARE EDUCATION/TRAINING PROGRAM

## 2022-05-05 NOTE — PATIENT INSTRUCTIONS
Post- Operative Wound Care    Your doctor has performed local skin surgery today.  Vaseline ointment and a pressure bandage were placed after the surgery.  It is very important that you keep this bandage in place for 24 hours.  This will decrease the risk of post - operative infection and bleeding.  After 24 hours, you may remove the band aid and wash the area with warm soap and water and apply Vaseline ointment.  Many patients prefer to use Neosporin or Bacitracin ointment.  This is acceptable; however know that you can develop an allergy to this medication even if you have used it safely for years.  It is important to keep the area moist.  Letting it dry out and get air slows healing time, will worsen the scar, and make it more difficult to remove the stitches.  Band aid is optional after first 24 hours.    It is best NOT to use hydrogen peroxide or antibacterial soap to wash the operative site.       If you notice increasing redness, tenderness, pain, or yellow drainage at the biopsy or surgical site, please notify your doctor.  These are signs of an infection.    If your biopsy/surgical site is bleeding, apply firm pressure for 15 minutes straight.  Repeat for another 15 minutes, if it is still bleeding.   If the surgical site continues to bleed, then please contact your doctor.      For [facility] users:   You will receive your pathology results in [facility] as soon as they are available. Please be assured that your physician/provider will review your results and contact you should additional treatment be required. This is one more way [employer] is putting you first.

## 2022-05-05 NOTE — PROGRESS NOTES
PROCEDURE: Elliptical excision with intermediate layered repair    ANESTHETIC: 2 cc 1% Lidocaine with Epinephrine 1:100,000    SURGICAL PREP: Betadine and EtOH    SURGEON: Dane Dueñas MD    ASSISTANTS:  Steffen Saenz MA    PREOPERATIVE DIAGNOSIS: Subcutaneous nodule    POSTOPERATIVE DIAGNOSIS: Subcutaneous nodule    PATHOLOGIC DIAGNOSIS: Pending    LOCATION: scalp    INITIAL LESION SIZE: 1 cm    EXCISED DIAMETER: 1 cm    PREPARATION:  The diagnosis, procedure, alternatives, benefits and risks, including but not limited to: drug reactions, pain, scar or cosmetic defect, local sensation disturbances, and/or recurrence of present condition were explained to the patient. The patient elected to proceed.    PROCEDURE:  The area of the scalp was prepped, draped, and anesthetized in the usual sterile fashion. Lesional tissue was carefully marked prior to administration of the anesthesia. An elliptical excision was drawn around the lesion. A fusiform elliptical excision was done with a #15 blade carried down completely through the dermis into the subcutaneous tissues. Then, with a combination of blunt and sharp dissection, the lesion was removed.  The specimen was submitted for histologic evaluation. The operative site was widely undermined in the subcutaneous tissue plane. Blood loss was minimal. The wound was then superficially closed with interrupted 3-0 Prolene sutures.    The patient tolerated the procedure well.    The area was cleaned and dressed appropriately and the patient was given wound care instructions, as well as an appointment for follow-up evaluation.    LENGTH OF REPAIR: 1 cm    There were no vitals filed for this visit.    Follow up if symptoms worsen or fail to improve.

## 2022-05-11 LAB
FINAL PATHOLOGIC DIAGNOSIS: NORMAL
GROSS: NORMAL
Lab: NORMAL
MICROSCOPIC EXAM: NORMAL

## 2022-05-12 ENCOUNTER — CLINICAL SUPPORT (OUTPATIENT)
Dept: DERMATOLOGY | Facility: CLINIC | Age: 57
End: 2022-05-12
Payer: COMMERCIAL

## 2022-05-12 DIAGNOSIS — Z48.02 VISIT FOR SUTURE REMOVAL: Primary | ICD-10-CM

## 2022-05-12 PROCEDURE — 99999 PR PBB SHADOW E&M-EST. PATIENT-LVL II: CPT | Mod: PBBFAC,,,

## 2022-05-12 PROCEDURE — 99999 PR PBB SHADOW E&M-EST. PATIENT-LVL II: ICD-10-PCS | Mod: PBBFAC,,,

## 2022-06-21 ENCOUNTER — PATIENT MESSAGE (OUTPATIENT)
Dept: PRIMARY CARE CLINIC | Facility: CLINIC | Age: 57
End: 2022-06-21
Payer: COMMERCIAL

## 2022-07-28 ENCOUNTER — PATIENT MESSAGE (OUTPATIENT)
Dept: ADMINISTRATIVE | Facility: HOSPITAL | Age: 57
End: 2022-07-28
Payer: COMMERCIAL

## 2022-07-28 DIAGNOSIS — Z12.11 SCREENING FOR COLON CANCER: ICD-10-CM

## 2022-08-11 DIAGNOSIS — I10 ESSENTIAL HYPERTENSION: ICD-10-CM

## 2022-08-24 ENCOUNTER — OFFICE VISIT (OUTPATIENT)
Dept: PRIMARY CARE CLINIC | Facility: CLINIC | Age: 57
End: 2022-08-24
Payer: COMMERCIAL

## 2022-08-24 VITALS
BODY MASS INDEX: 30.46 KG/M2 | HEIGHT: 66 IN | TEMPERATURE: 98 F | WEIGHT: 189.5 LBS | SYSTOLIC BLOOD PRESSURE: 118 MMHG | DIASTOLIC BLOOD PRESSURE: 78 MMHG | HEART RATE: 65 BPM

## 2022-08-24 DIAGNOSIS — G47.00 INSOMNIA, UNSPECIFIED TYPE: ICD-10-CM

## 2022-08-24 DIAGNOSIS — E55.9 VITAMIN D DEFICIENCY DISEASE: ICD-10-CM

## 2022-08-24 DIAGNOSIS — Z12.31 ENCOUNTER FOR SCREENING MAMMOGRAM FOR BREAST CANCER: ICD-10-CM

## 2022-08-24 DIAGNOSIS — I10 ESSENTIAL HYPERTENSION: ICD-10-CM

## 2022-08-24 DIAGNOSIS — Z00.00 ROUTINE GENERAL MEDICAL EXAMINATION AT A HEALTH CARE FACILITY: Primary | ICD-10-CM

## 2022-08-24 PROCEDURE — 99999 PR PBB SHADOW E&M-EST. PATIENT-LVL III: CPT | Mod: PBBFAC,,, | Performed by: FAMILY MEDICINE

## 2022-08-24 PROCEDURE — 99999 PR PBB SHADOW E&M-EST. PATIENT-LVL III: ICD-10-PCS | Mod: PBBFAC,,, | Performed by: FAMILY MEDICINE

## 2022-08-24 PROCEDURE — 99396 PREV VISIT EST AGE 40-64: CPT | Mod: S$GLB,,, | Performed by: FAMILY MEDICINE

## 2022-08-24 PROCEDURE — 99396 PR PREVENTIVE VISIT,EST,40-64: ICD-10-PCS | Mod: S$GLB,,, | Performed by: FAMILY MEDICINE

## 2022-08-24 NOTE — PROGRESS NOTES
Subjective:      Patient ID: Makayla Neal is a 57 y.o. female.    Chief Complaint: Annual Exam    Disclaimer:  This note is prepared using voice recognition software and as such is likely to have errors and has not been proof read. Please contact me for questions.     Patient's coming in today for prevention exam . Working from home.  Lives at [facility].  Did stop the vitamin D last year. Does labs each year at [facility]. Will do mammo and pap with Dr. Ирина Nieves this year.   With working from home, will go in her backyard will get some sun as well.    Needing to still do colon ca screening. Has fitkit.    Not willing to do colonoscopy yet.   Did get vaccinated against covid.     family history includes Breast cancer (age of onset: 59) in her sister; COPD in her mother and sister; Cancer in her sister; Deep vein thrombosis in her mother; Diabetes in her father; Heart attack (age of onset: 53) in her sister; Heart attack (age of onset: 55) in her father; Heart disease in her father and sister; Hyperlipidemia in her father; Hypertension in her mother; Kidney disease in her father; Pacemaker/defibrilator in her sister; Stroke in her mother.  [facility] Reason For Visit    8/22/2022  3:51 PM CDT - Filed by Patient   What is your primary reason for visit? Other/Annual   Have you experienced any of the following:   Change in activity? No   Unexpected weight change? No   Neck pain? No   Hearing loss? No   Runny nose? No   Trouble swallowing? No   Eye discharge? No   Changes in vision? Yes   Chest tightness? No   Wheezing? No   Chest pain? No   Heart beating fast or racing? No   Blood in stool? No   Constipation? No   Vomiting? No   Diarrhea? No   Drinking much more than usual? No   Urinating much more than usual? No   Difficulty urinating? No   Blood in the urine? No   Menstrual problem? No   Painful urination? No   Joint swelling? Yes   Joint pain? No   Headaches? No   Weakness? No   Confusion? No   Feeling  depressed? No     [facility] Peq Documents    8/22/2022  3:53 PM CDT - Filed by Patient   Would you like a copy of [facility] Financial Assistance Policy Summary? No, I would not like a copy.   Would you like to receive more information regarding your rights and protections under the [place] Billing act? No, I would not.     [facility] Peq S[facility]    8/22/2022  3:57 PM CDT - Filed by Patient   On average, how many days per week do you engage in moderate to strenuous exercise (like a brisk walk)? 6 days   On average, how many minutes do you engage in exercise at this level? 20 min   Do you feel stress - tense, restless, nervous, or anxious, or unable to sleep at night because your mind is troubled all the time - these days? Only a little   Do you belong to any clubs or organizations such as Catholic groups, unions, fraternal or athletic groups, or school groups? No   How often do you attend meetings of the clubs or organizations you belong to? Never   In a typical week, how many times do you talk on the phone with family, friends, or neighbors? More than three times a week   How often do you get together with friends or relatives? More than three times a week   Are you , , , , never , or living with a partner?    How hard is it for you to pay for the very basics like food, housing, medical care, and heating? Not hard at all   Within the past 12 months, you worried that your food would run out before you got the money to buy more. Never true   Within the past 12 months, the food you bought just didnt last and you didnt have money to get more. Never true   In the past 12 months, has lack of transportation kept you from medical appointments or from getting medications? No   In the past 12 months, has lack of transportation kept you from meetings, work, or from getting things needed for daily living? No   How often do you have a drink containing alcohol? 2-3 times a week   How many drinks  containing alcohol do you have on a typical day when you are drinking? 1 or 2   How often do you have six or more drinks on one occasion? Never   In the last 12 months, was there a time when you were not able to pay the mortgage or rent on time? No   In the last 12 months, how many places have you lived? (range: at least 0) 1   In the last 12 months, was there a time when you did not have a steady place to sleep or slept in a shelter (including now)? No       Lab Results   Component Value Date    HGBA1C 5.4 08/06/2021    HGBA1C 5.5 08/11/2020    HGBA1C 5.7 (H) 06/25/2019      Lab Results   Component Value Date    CHOL 236 (H) 08/06/2021    CHOL 262 (H) 08/11/2020    CHOL 264 (H) 06/25/2019     Lab Results   Component Value Date    LDLCALC 133 (H) 08/06/2021    LDLCALC 162.2 (H) 08/11/2020    LDLCALC 173.4 (H) 06/25/2019       Wt Readings from Last 10 Encounters:   08/24/22 86 kg (189 lb 7.8 oz)   08/05/21 84 kg (185 lb 3 oz)   07/28/21 84.5 kg (186 lb 4.6 oz)   08/11/20 85.4 kg (188 lb 4.4 oz)   06/25/19 88.3 kg (194 lb 10.7 oz)   05/10/18 93.7 kg (206 lb 9.1 oz)   05/09/17 90 kg (198 lb 6.6 oz)   01/19/17 88.9 kg (196 lb)   10/11/16 87.9 kg (193 lb 12.6 oz)   02/17/16 88.8 kg (195 lb 12.3 oz)       The 10-year ASCVD risk score (Qian SALGADO Jr., et al., 2013) is: 2.2%    Values used to calculate the score:      Age: 57 years      Sex: Female      Is Non- : No      Diabetic: No      Tobacco smoker: No      Systolic Blood Pressure: 118 mmHg      Is BP treated: Yes      HDL Cholesterol: 88 mg/dL      Total Cholesterol: 236 mg/dL        Lab Results   Component Value Date    WBC 3.9 08/06/2021    HGB 12.7 08/06/2021    HCT 38.2 08/06/2021     08/06/2021    CHOL 236 (H) 08/06/2021    TRIG 61 08/06/2021    HDL 88 08/06/2021    ALT 11 08/06/2021    AST 15 08/06/2021     08/06/2021    K 4.9 08/06/2021     08/06/2021    CREATININE 0.85 08/06/2021    BUN 16 08/06/2021    CO2 30 08/06/2021     TSH 4.16 08/06/2021    INR 1.0 10/11/2016    HGBA1C 5.4 08/06/2021    MICROALBUR 0.2 08/06/2021       Mammo Digital Screening Bilat w/ Bran  Narrative: Result:  Mammo Digital Screening Bilat w/ Bran    History:  Patient is 56 y.o. and is seen for a screening mammogram.    Films Compared:  Compared to: 11/13/2012 Mammo Previous and 10/21/2011 Mammo Previous     Findings:   This procedure was performed using tomosynthesis.   Computer-aided detection was utilized in the interpretation of this   examination.    The breasts have scattered areas of fibroglandular density. There is no   evidence of suspicious masses, microcalcifications or architectural   distortion.  Impression:    No mammographic evidence of malignancy.    BI-RADS Category 1: Negative    Recommendation:  Routine screening mammogram in 1 year is recommended.    Your estimated lifetime risk of breast cancer (to age 85) based on   Tyrer-Cuzick risk assessment model is Tyrer-Cuzick: 11.83 %. According to   the American Cancer Society, patients with a lifetime breast cancer risk   of 20% or higher might benefit from supplemental screening tests.        Review of Systems   Constitutional: Negative for activity change and unexpected weight change.   HENT: Negative for hearing loss, rhinorrhea and trouble swallowing.    Eyes: Positive for visual disturbance. Negative for discharge.   Respiratory: Negative for chest tightness and wheezing.    Cardiovascular: Negative for chest pain and palpitations.   Gastrointestinal: Negative for blood in stool, constipation, diarrhea and vomiting.   Endocrine: Negative for polydipsia and polyuria.   Genitourinary: Negative for difficulty urinating, dysuria, hematuria and menstrual problem.   Musculoskeletal: Negative for arthralgias, joint swelling and neck pain.   Neurological: Negative for weakness and headaches.   Psychiatric/Behavioral: Negative for confusion and dysphoric mood.     Objective:     Vitals:    08/24/22  "0745   BP: 118/78   Pulse: 65   Temp: 98.1 °F (36.7 °C)   SpO2: (!) 88%   Weight: 86 kg (189 lb 7.8 oz)   Height: 5' 6" (1.676 m)     Physical Exam  Vitals reviewed.   Constitutional:       Appearance: Normal appearance. She is well-developed, well-groomed and overweight.   HENT:      Head: Normocephalic and atraumatic.      Right Ear: Tympanic membrane and external ear normal.      Left Ear: Tympanic membrane and external ear normal.      Nose: Nose normal.      Mouth/Throat:      Mouth: Mucous membranes are moist.      Pharynx: Oropharynx is clear.   Eyes:      Conjunctiva/sclera: Conjunctivae normal.      Pupils: Pupils are equal, round, and reactive to light.   Neck:      Thyroid: No thyromegaly.   Cardiovascular:      Rate and Rhythm: Normal rate and regular rhythm.      Heart sounds: No murmur heard.    No friction rub. No gallop.   Pulmonary:      Effort: Pulmonary effort is normal. No respiratory distress.      Breath sounds: No wheezing or rales.   Abdominal:      General: Bowel sounds are normal. There is no distension.      Palpations: Abdomen is soft.      Tenderness: There is no abdominal tenderness. There is no rebound.   Musculoskeletal:         General: Normal range of motion.      Cervical back: Normal range of motion and neck supple.   Lymphadenopathy:      Cervical: No cervical adenopathy.   Skin:     General: Skin is warm and dry.      Findings: No rash.   Neurological:      Mental Status: She is alert and oriented to person, place, and time.   Psychiatric:         Attention and Perception: Attention and perception normal.         Mood and Affect: Mood and affect normal.         Speech: Speech normal.         Behavior: Behavior normal. Behavior is cooperative.         Thought Content: Thought content normal.         Cognition and Memory: Cognition and memory normal.         Judgment: Judgment normal.       Assessment:     1. Routine general medical examination at a health care facility    2. " Essential hypertension    3. Vitamin D deficiency disease    4. Insomnia, unspecified type    5. Encounter for screening mammogram for breast cancer      Plan:   Makayla was seen today for annual exam.    Diagnoses and all orders for this visit:    Routine general medical examination at a [facility] facility- - labs ordered. Discussed Health Maintenance issues.     -     TSH; Future  -     T4, Free; Future  -     Lipid Panel; Future  -     Hemoglobin A1C; Future  -     Comprehensive Metabolic Panel; Future  -     CBC Auto Differential; Future  -     Microalbumin/Creatinine Ratio, Urine; Future  -     Vitamin D; Future  -     TSH  -     T4, Free  -     Lipid Panel  -     Hemoglobin A1C  -     Comprehensive Metabolic Panel  -     CBC Auto Differential  -     Microalbumin/Creatinine Ratio, Urine  -     Vitamin D    Essential hypertension - stable, Continue with current medications and interventions. Labs ordered and will be reviewed.     -     TSH; Future  -     T4, Free; Future  -     Lipid Panel; Future  -     Hemoglobin A1C; Future  -     Comprehensive Metabolic Panel; Future  -     CBC Auto Differential; Future  -     Microalbumin/Creatinine Ratio, Urine; Future  -     Vitamin D; Future  -     TSH  -     T4, Free  -     Lipid Panel  -     Hemoglobin A1C  -     Comprehensive Metabolic Panel  -     CBC Auto Differential  -     Microalbumin/Creatinine Ratio, Urine  -     Vitamin D    Vitamin D deficiency disease  -     TSH; Future - stable, Continue with current medications and interventions. Labs ordered and will be reviewed.     -     T4, Free; Future  -     Lipid Panel; Future  -     Hemoglobin A1C; Future  -     Comprehensive Metabolic Panel; Future  -     CBC Auto Differential; Future  -     Microalbumin/Creatinine Ratio, Urine; Future  -     Vitamin D; Future  -     TSH  -     T4, Free  -     Lipid Panel  -     Hemoglobin A1C  -     Comprehensive Metabolic Panel  -     CBC Auto Differential  -      Microalbumin/Creatinine Ratio, Urine  -     Vitamin D    Insomnia, unspecified type - stable, Continue with current medications and interventions. Labs ordered and will be reviewed.     -     TSH; Future  -     T4, Free; Future  -     Lipid Panel; Future  -     Hemoglobin A1C; Future  -     Comprehensive Metabolic Panel; Future  -     CBC Auto Differential; Future  -     Microalbumin/Creatinine Ratio, Urine; Future  -     Vitamin D; Future  -     TSH  -     T4, Free  -     Lipid Panel  -     Hemoglobin A1C  -     Comprehensive Metabolic Panel  -     CBC Auto Differential  -     Microalbumin/Creatinine Ratio, Urine  -     Vitamin D    Encounter for screening mammogram for breast cancer  -     Mammo Digital Screening Bilat w/ Bran; Future          Health Maintenance Due   Topic Date Due    Colorectal Cancer Screening  Never done    Cervical Cancer Screening  11/11/2016    COVID-19 Vaccine (4 - Booster for Pfizer series) 05/11/2022    Mammogram  08/05/2022    Lipid Panel  08/06/2022       Follow up in about 1 year (around 8/24/2023) for physical with Dr GAO.    There are no Patient Instructions on file for this visit.

## 2022-09-08 ENCOUNTER — TELEPHONE (OUTPATIENT)
Dept: OBSTETRICS AND GYNECOLOGY | Facility: CLINIC | Age: 57
End: 2022-09-08
Payer: COMMERCIAL

## 2022-09-08 NOTE — TELEPHONE ENCOUNTER
Called pt to notify that Dr. Ирина Albarran will not be in clinic on the day of her appt and to help reschedule. No answer. LVM.

## 2022-09-28 ENCOUNTER — OFFICE VISIT (OUTPATIENT)
Dept: OBSTETRICS AND GYNECOLOGY | Facility: CLINIC | Age: 57
End: 2022-09-28
Payer: COMMERCIAL

## 2022-09-28 ENCOUNTER — HOSPITAL ENCOUNTER (OUTPATIENT)
Dept: RADIOLOGY | Facility: HOSPITAL | Age: 57
Discharge: HOME | End: 2022-09-28
Attending: FAMILY MEDICINE
Payer: COMMERCIAL

## 2022-09-28 VITALS
SYSTOLIC BLOOD PRESSURE: 128 MMHG | BODY MASS INDEX: 30.93 KG/M2 | WEIGHT: 192.44 LBS | DIASTOLIC BLOOD PRESSURE: 76 MMHG | HEIGHT: 66 IN

## 2022-09-28 VITALS — WEIGHT: 189 LBS | BODY MASS INDEX: 30.37 KG/M2 | HEIGHT: 66 IN

## 2022-09-28 DIAGNOSIS — Z01.419 ENCOUNTER FOR GYNECOLOGICAL EXAMINATION WITHOUT ABNORMAL FINDING: Primary | ICD-10-CM

## 2022-09-28 DIAGNOSIS — Z12.31 ENCOUNTER FOR SCREENING MAMMOGRAM FOR BREAST CANCER: ICD-10-CM

## 2022-09-28 PROCEDURE — 77067 SCR MAMMO BI INCL CAD: CPT | Mod: 26,,, | Performed by: RADIOLOGY

## 2022-09-28 PROCEDURE — 77063 MAMMO DIGITAL SCREENING BILAT WITH TOMO: ICD-10-PCS | Mod: 26,,, | Performed by: RADIOLOGY

## 2022-09-28 PROCEDURE — 99999 PR PBB SHADOW E&M-EST. PATIENT-LVL III: CPT | Mod: PBBFAC,,, | Performed by: NURSE PRACTITIONER

## 2022-09-28 PROCEDURE — 88141 CYTOPATH C/V INTERPRET: CPT | Mod: ,,, | Performed by: PATHOLOGY

## 2022-09-28 PROCEDURE — 87624 HPV HI-RISK TYP POOLED RSLT: CPT | Performed by: NURSE PRACTITIONER

## 2022-09-28 PROCEDURE — 77067 SCR MAMMO BI INCL CAD: CPT | Mod: TC

## 2022-09-28 PROCEDURE — 77063 BREAST TOMOSYNTHESIS BI: CPT | Mod: 26,,, | Performed by: RADIOLOGY

## 2022-09-28 PROCEDURE — 99386 PR PREVENTIVE VISIT,NEW,40-64: ICD-10-PCS | Mod: S$GLB,,, | Performed by: NURSE PRACTITIONER

## 2022-09-28 PROCEDURE — 99386 PREV VISIT NEW AGE 40-64: CPT | Mod: S$GLB,,, | Performed by: NURSE PRACTITIONER

## 2022-09-28 PROCEDURE — 88141 PR  CYTOPATH CERV/VAG INTERPRET: ICD-10-PCS | Mod: ,,, | Performed by: PATHOLOGY

## 2022-09-28 PROCEDURE — 77063 BREAST TOMOSYNTHESIS BI: CPT | Mod: TC

## 2022-09-28 PROCEDURE — 77067 MAMMO DIGITAL SCREENING BILAT WITH TOMO: ICD-10-PCS | Mod: 26,,, | Performed by: RADIOLOGY

## 2022-09-28 PROCEDURE — 99999 PR PBB SHADOW E&M-EST. PATIENT-LVL III: ICD-10-PCS | Mod: PBBFAC,,, | Performed by: NURSE PRACTITIONER

## 2022-09-28 PROCEDURE — 88175 CYTOPATH C/V AUTO FLUID REDO: CPT | Performed by: PATHOLOGY

## 2022-09-28 NOTE — PROGRESS NOTES
CC: Well woman exam    Makayla Neal is a 57 y.o. female  presents for well woman exam.  LMP: No LMP recorded. Patient is postmenopausal..  No issues, problems, or complaints.    Last pap about 6 years ago  Mmg done today  No cycle for about 6-7 years  No issues going through menopause  Has hx of calcified fibroid found on another scan - no issues with it     Past Medical History:   Diagnosis Date    Allergic rhinitis     Chronic abdominal pain     Chronic back pain     Gallbladder attack     HTN (hypertension)     IBS (irritable bowel syndrome)     Menstrual migraine     Obesity      Past Surgical History:   Procedure Laterality Date    APPENDECTOMY      CHOLECYSTECTOMY  2012    DILATION AND CURETTAGE OF UTERUS      SPINE SURGERY  2016    TONSILLECTOMY, ADENOIDECTOMY       Social History     Socioeconomic History    Marital status:    Occupational History     Employer: [employer]   Tobacco Use    Smoking status: Never    Smokeless tobacco: Never   Substance and Sexual Activity    Alcohol use: Yes     Alcohol/week: 2.0 standard drinks     Types: 2 Glasses of wine per week    Drug use: Never    Sexual activity: Yes     Partners: Male     Birth control/protection: Post-menopausal     Social Determinants of Health     Financial Resource Strain: Low Risk     Difficulty of Paying Living Expenses: Not hard at all   Food Insecurity: No Food Insecurity    Worried About Running Out of Food in the Last Year: Never true    Ran Out of Food in the Last Year: Never true   Transportation Needs: No Transportation Needs    Lack of Transportation (Medical): No    Lack of Transportation (Non-Medical): No   Physical Activity: Insufficiently Active    Days of Exercise per Week: 6 days    Minutes of Exercise per Session: 20 min   Stress: No Stress Concern Present    Feeling of Stress : Only a little   Social Connections: Unknown    Frequency of Communication with Friends and Family: More than three times a  "week    Frequency of Social Gatherings with Friends and Family: More than three times a week    Active Member of Clubs or Organizations: No    Attends Club or Organization Meetings: Never    Marital Status:    Housing Stability: Low Risk     Unable to Pay for Housing in the Last Year: No    Number of Places Lived in the Last Year: 1    Unstable Housing in the Last Year: No     Family History   Problem Relation Age of Onset    Hypertension Mother     Deep vein thrombosis Mother         while on ocps    COPD Mother     Stroke Mother         Mild stroke    Heart attack Father 55    Diabetes Father     Hyperlipidemia Father     Heart disease Father         Heart Attack before age 55    Kidney disease Father     Heart attack Sister 53    Heart disease Sister         Heart Attack before age 55    Breast cancer Sister 59        smoker, no children    Pacemaker/defibrilator Sister     Cancer Sister         Breast    COPD Sister      OB History          1    Para   1    Term   1            AB        Living   1         SAB        IAB        Ectopic        Multiple        Live Births                     /76 (BP Location: Left arm, Patient Position: Sitting, BP Method: Medium (Manual))   Ht 5' 6" (1.676 m)   Wt 87.3 kg (192 lb 7.4 oz)   BMI 31.06 kg/m²       ROS:  GENERAL: Denies weight gain or weight loss. Feeling well overall.   SKIN: Denies rash or lesions.   HEAD: Denies head injury or headache.   NODES: Denies enlarged lymph nodes.   CHEST: Denies chest pain or shortness of breath.   CARDIOVASCULAR: Denies palpitations or left sided chest pain.   ABDOMEN: No abdominal pain, constipation, diarrhea, nausea, vomiting or rectal bleeding.   URINARY: No frequency, dysuria, hematuria, or burning on urination.  REPRODUCTIVE: See HPI.   BREASTS: The patient performs breast self-examination and denies pain, lumps, or nipple discharge.   HEMATOLOGIC: No easy bruisability or excessive bleeding. "   MUSCULOSKELETAL: Denies joint pain or swelling.   NEUROLOGIC: Denies syncope or weakness.   PSYCHIATRIC: Denies depression, anxiety or mood swings.    PHYSICAL EXAM:  APPEARANCE: Well nourished, well developed, in no acute distress.  AFFECT: WNL, alert and oriented x 3  SKIN: No acne or hirsutism  NECK: Neck symmetric without masses or thyromegaly  NODES: No inguinal, cervical, axillary, or femoral lymph node enlargement  CHEST: Good respiratory effect  ABDOMEN: Soft.  No tenderness or masses.  No hepatosplenomegaly.  No hernias.  BREASTS: Symmetrical, no skin changes or visible lesions.  No palpable masses, nipple discharge bilaterally.  PELVIC: Normal external genitalia without lesions.  Normal hair distribution.  Adequate perineal body, normal urethral meatus.  Vagina atrophic without lesions or discharge.  Cervix pink, without lesions, discharge or tenderness.  No significant cystocele or rectocele.  Bimanual exam shows uterus to be normal size, regular, mobile and nontender.  Adnexa without masses or tenderness.    EXTREMITIES: No edema.  Physical Exam    1. Encounter for gynecological examination without abnormal finding  Liquid-Based Pap Smear, Screening    HPV High Risk Genotypes, PCR       AND PLAN:    Patient was counseled today on A.C.S. Pap guidelines and recommendations for yearly pelvic exams, mammograms and monthly self breast exams; to see her PCP for other health maintenance.                    Answers submitted by the patient for this visit:  Gynecologic Exam Questionnaire  (Submitted on 9/27/2022)  Chief Complaint: Gynecologic exam  genital itching: No  genital lesions: No  genital odor: No  genital rash: No  missed menses: No  pelvic pain: No  vaginal bleeding: No  vaginal discharge: No  Pregnant now?: No  abdominal pain: No  anorexia: No  back pain: No  chills: No  constipation: No  diarrhea: No  discolored urine: No  dysuria: No  fever: No  flank pain: No  frequency: No  headaches:  No  hematuria: No  nausea: No  painful intercourse: No  rash: No  urgency: No  vomiting: No  Sexual activity: sexually active  Partner with STD symptoms: no  Birth control: nothing  Menstrual history: postmenopausal  STD: No  abdominal surgery: Yes   section: No  Ectopic pregnancy: No  Endometriosis: No  herpes simplex: No  gynecological surgery: Yes  menorrhagia: No  metrorrhagia: No  miscarriage: No  ovarian cysts: No  perineal abscess: No  PID: No  terminated pregnancy: No  vaginosis: No

## 2022-09-30 NOTE — PROGRESS NOTES
Your mammogram is normal. You will need to repeat the exam again in 1-2 years. If you have further questions please let me know. Trish Phillips MD

## 2022-10-03 ENCOUNTER — PATIENT MESSAGE (OUTPATIENT)
Dept: ADMINISTRATIVE | Facility: HOSPITAL | Age: 57
End: 2022-10-03
Payer: COMMERCIAL

## 2022-10-04 LAB
HPV HR 12 DNA SPEC QL NAA+PROBE: NEGATIVE
HPV16 AG SPEC QL: NEGATIVE
HPV18 DNA SPEC QL NAA+PROBE: NEGATIVE

## 2022-10-05 LAB
25(OH)D3 SERPL-MCNC: 97 NG/ML (ref 30–100)
ALBUMIN SERPL-MCNC: 4.2 G/DL (ref 3.6–5.1)
ALBUMIN/CREAT UR: 7 MCG/MG CREAT
ALBUMIN/GLOB SERPL: 2 (CALC) (ref 1–2.5)
ALP SERPL-CCNC: 64 U/L (ref 37–153)
ALT SERPL-CCNC: 13 U/L (ref 6–29)
AST SERPL-CCNC: 17 U/L (ref 10–35)
BASOPHILS # BLD AUTO: 49 CELLS/UL (ref 0–200)
BASOPHILS NFR BLD AUTO: 1.2 %
BILIRUB SERPL-MCNC: 0.5 MG/DL (ref 0.2–1.2)
BUN SERPL-MCNC: 15 MG/DL (ref 7–25)
BUN/CREAT SERPL: ABNORMAL (CALC) (ref 6–22)
CALCIUM SERPL-MCNC: 9.2 MG/DL (ref 8.6–10.4)
CHLORIDE SERPL-SCNC: 103 MMOL/L (ref 98–110)
CHOLEST SERPL-MCNC: 233 MG/DL
CHOLEST/HDLC SERPL: 3.2 (CALC)
CO2 SERPL-SCNC: 33 MMOL/L (ref 20–32)
CREAT SERPL-MCNC: 0.83 MG/DL (ref 0.5–1.03)
CREAT UR-MCNC: 69 MG/DL (ref 20–275)
EGFR: 82 ML/MIN/1.73M2
EOSINOPHIL # BLD AUTO: 119 CELLS/UL (ref 15–500)
EOSINOPHIL NFR BLD AUTO: 2.9 %
ERYTHROCYTE [DISTWIDTH] IN BLOOD BY AUTOMATED COUNT: 13.8 % (ref 11–15)
GLOBULIN SER CALC-MCNC: 2.1 G/DL (CALC) (ref 1.9–3.7)
GLUCOSE SERPL-MCNC: 80 MG/DL (ref 65–99)
HBA1C MFR BLD: 5.5 % OF TOTAL HGB
HCT VFR BLD AUTO: 36.1 % (ref 35–45)
HDLC SERPL-MCNC: 72 MG/DL
HGB BLD-MCNC: 11.6 G/DL (ref 11.7–15.5)
LDLC SERPL CALC-MCNC: 143 MG/DL (CALC)
LYMPHOCYTES # BLD AUTO: 1533 CELLS/UL (ref 850–3900)
LYMPHOCYTES NFR BLD AUTO: 37.4 %
MCH RBC QN AUTO: 30 PG (ref 27–33)
MCHC RBC AUTO-ENTMCNC: 32.1 G/DL (ref 32–36)
MCV RBC AUTO: 93.3 FL (ref 80–100)
MICROALBUMIN UR-MCNC: 0.5 MG/DL
MONOCYTES # BLD AUTO: 365 CELLS/UL (ref 200–950)
MONOCYTES NFR BLD AUTO: 8.9 %
NEUTROPHILS # BLD AUTO: 2034 CELLS/UL (ref 1500–7800)
NEUTROPHILS NFR BLD AUTO: 49.6 %
NONHDLC SERPL-MCNC: 161 MG/DL (CALC)
PLATELET # BLD AUTO: 223 THOUSAND/UL (ref 140–400)
PMV BLD REES-ECKER: 11 FL (ref 7.5–12.5)
POTASSIUM SERPL-SCNC: 4.6 MMOL/L (ref 3.5–5.3)
PROT SERPL-MCNC: 6.3 G/DL (ref 6.1–8.1)
RBC # BLD AUTO: 3.87 MILLION/UL (ref 3.8–5.1)
SODIUM SERPL-SCNC: 143 MMOL/L (ref 135–146)
T4 FREE SERPL-MCNC: 1 NG/DL (ref 0.8–1.8)
TRIGL SERPL-MCNC: 81 MG/DL
TSH SERPL-ACNC: 4.75 MIU/L (ref 0.4–4.5)
WBC # BLD AUTO: 4.1 THOUSAND/UL (ref 3.8–10.8)

## 2022-10-06 ENCOUNTER — PATIENT MESSAGE (OUTPATIENT)
Dept: OBSTETRICS AND GYNECOLOGY | Facility: CLINIC | Age: 57
End: 2022-10-06
Payer: COMMERCIAL

## 2022-10-06 LAB
FINAL PATHOLOGIC DIAGNOSIS: ABNORMAL
Lab: ABNORMAL

## 2022-11-02 DIAGNOSIS — I10 ESSENTIAL HYPERTENSION: ICD-10-CM

## 2022-11-02 RX ORDER — OLMESARTAN MEDOXOMIL AND HYDROCHLOROTHIAZIDE 20/12.5 20; 12.5 MG/1; MG/1
TABLET ORAL
Qty: 90 TABLET | Refills: 0 | Status: SHIPPED | OUTPATIENT
Start: 2022-11-02 | End: 2023-01-26

## 2022-11-02 RX ORDER — FUROSEMIDE 20 MG/1
TABLET ORAL
Qty: 90 TABLET | Refills: 0 | Status: SHIPPED | OUTPATIENT
Start: 2022-11-02 | End: 2023-01-26

## 2022-11-02 NOTE — TELEPHONE ENCOUNTER
Refill Routing Note   Medication(s) are not appropriate for processing by [facility] for the following reason(s):      - Drug-Drug Interaction (LASIX AND BENICAR HCT)    [facility] action(s):  Defer Medication-related problems identified: Drug-drug interaction        Medication reconciliation completed: No     Appointments  past 12m or future 3m with PCP    Date Provider   Last Visit   8/24/2022 Trish Phillips MD   Next Visit   Visit date not found Trish Phillips MD   ED visits in past 90 days: 0        Note composed:10:32 AM 11/02/2022

## 2022-11-02 NOTE — TELEPHONE ENCOUNTER
No new care gaps identified.  [facility] Embedded Care Gaps. Reference number: [id number]. 11/02/2022   7:23:10 AM CDT

## 2022-11-02 NOTE — TELEPHONE ENCOUNTER
----- Message from Bailey Champion sent at 11/2/2022 12:13 PM CDT -----  Contact: Lisa/ [facility] with [employer] is calling to speak with the nurse regarding previous appt. Reports diagnosis code is only on the mammogram. Please give Lisa a call back at [phone].

## 2022-12-23 ENCOUNTER — OFFICE VISIT (OUTPATIENT)
Dept: PRIMARY CARE CLINIC | Facility: CLINIC | Age: 57
End: 2022-12-23
Payer: COMMERCIAL

## 2022-12-23 ENCOUNTER — PATIENT MESSAGE (OUTPATIENT)
Dept: PRIMARY CARE CLINIC | Facility: CLINIC | Age: 57
End: 2022-12-23

## 2022-12-23 DIAGNOSIS — U07.1 COVID-19: Primary | ICD-10-CM

## 2022-12-23 PROCEDURE — 99441 PR PHYSICIAN TELEPHONE EVALUATION 5-10 MIN: ICD-10-PCS | Mod: 95,,, | Performed by: PHYSICIAN ASSISTANT

## 2022-12-23 PROCEDURE — 99441 PR PHYSICIAN TELEPHONE EVALUATION 5-10 MIN: CPT | Mod: 95,,, | Performed by: PHYSICIAN ASSISTANT

## 2022-12-23 RX ORDER — PROMETHAZINE HYDROCHLORIDE AND DEXTROMETHORPHAN HYDROBROMIDE 6.25; 15 MG/5ML; MG/5ML
5 SYRUP ORAL NIGHTLY PRN
Qty: 118 ML | Refills: 0 | Status: SHIPPED | OUTPATIENT
Start: 2022-12-23 | End: 2023-01-02

## 2022-12-23 NOTE — PROGRESS NOTES
Subjective:      Patient ID: Makayla Neal is a 57 y.o. female.    Chief Complaint: COVID-19 Concerns    The patient location is: home   The chief complaint leading to consultation is:COVID-19     Visit type: audio (patient unable to connect through chart)     Face to Face time with patient: 8 minutes   12 minutes of total time spent on the encounter, which includes face to face time and non-face to face time preparing to see the patient (eg, review of tests), Obtaining and/or reviewing separately obtained history, Documenting clinical information in the electronic or other health record, Independently interpreting results (not separately reported) and communicating results to the patient/family/caregiver, or Care coordination (not separately reported).         Each patient to whom he or she provides medical services by telemedicine is:  (1) informed of the relationship between the physician and patient and the respective role of any other health care provider with respect to management of the patient; and (2) notified that he or she may decline to receive medical services by telemedicine and may withdraw from such care at any time.    Notes: Makayla Neal is a 57 y.o.female who presents to audio visit for positive COVID test.    Wednesday, sore throat, as day progressed had chills/shivering.   Went to sleep at night.  Next day wasn't feeling that good.  Took temp, was 100.3.  yesterday woke up worse.  Covid test came back positive.  Had just came back from [facility].  Woke remotely from home     Fever   This is a new problem. The current episode started in the past 7 days. The problem occurs 2 to 4 times per day. The problem has been waxing and waning. The maximum temperature noted was 100 to 100.9 F. The temperature was taken using an oral thermometer. Associated symptoms include congestion, coughing, ear pain, headaches, muscle aches, nausea, sleepiness and a sore throat. Pertinent negatives include no  abdominal pain, chest pain, diarrhea, rash, urinary pain, vomiting or wheezing. She has tried NSAIDs for the symptoms. The treatment provided mild relief.   Review of Systems   Constitutional:  Positive for fever.   HENT:  Positive for congestion, ear pain and sore throat.    Respiratory:  Positive for cough. Negative for wheezing.    Cardiovascular:  Negative for chest pain.   Gastrointestinal:  Positive for nausea. Negative for abdominal pain, diarrhea and vomiting.   Genitourinary:  Negative for dysuria.   Skin:  Negative for rash.   Neurological:  Positive for headaches.     Objective:   There were no vitals taken for this visit.  Physical Exam  Psychiatric:         Mood and Affect: Mood normal.         Behavior: Behavior normal.         Thought Content: Thought content normal.     Assessment:      1. COVID-19       Plan:   COVID-19  -     promethazine-dextromethorphan (PROMETHAZINE-DM) 6.25-15 mg/5 mL Syrp; Take 5 mLs by mouth nightly as needed.  Dispense: 118 mL; Refill: 0    Discussed current COVID guidelines  COVID risk score 2 - per guidelines, do not recommend antiviral treatment  Supportive care   Tylenol 1000 mg and ibuprofen 600 mg every 6 hours as needed for pain/body aches   Vitamin C/Vitamin/Zinc to support immune   Promethazine dm for night time cough   Quarantine at least 5 days, extend quarantine if symptoms persist up to 10 days.      Trish Good PA-C   Physician Assistant   [facility]

## 2022-12-23 NOTE — PATIENT INSTRUCTIONS
Tylenol 1000 mg and ibuprofen 600 mg every 6 hours as needed for pain/body aches   Vitamin C/Vitamin/Zinc to support immune   Promethazine dm for night time cough

## 2023-03-13 ENCOUNTER — PATIENT MESSAGE (OUTPATIENT)
Dept: PAIN MEDICINE | Facility: CLINIC | Age: 58
End: 2023-03-13
Payer: COMMERCIAL

## 2023-04-19 ENCOUNTER — PATIENT MESSAGE (OUTPATIENT)
Dept: ADMINISTRATIVE | Facility: HOSPITAL | Age: 58
End: 2023-04-19
Payer: COMMERCIAL

## 2023-06-24 DIAGNOSIS — I10 ESSENTIAL HYPERTENSION: ICD-10-CM

## 2023-06-24 NOTE — TELEPHONE ENCOUNTER
No care due was identified.  [facility] Embedded Care Due Messages. Reference number: [id number].   6/24/2023 4:42:52 PM CDT

## 2023-06-25 RX ORDER — FUROSEMIDE 20 MG/1
TABLET ORAL
Qty: 90 TABLET | Refills: 0 | Status: SHIPPED | OUTPATIENT
Start: 2023-06-25 | End: 2023-09-20 | Stop reason: SDUPTHER

## 2023-06-25 NOTE — TELEPHONE ENCOUNTER
Refill Decision Note   Makayla Ángel  is requesting a refill authorization.  Brief Assessment and Rationale for Refill:  Approve     Medication Therapy Plan:         Comments:     Note composed:2:59 AM 06/25/2023

## 2023-09-20 ENCOUNTER — OFFICE VISIT (OUTPATIENT)
Dept: PRIMARY CARE CLINIC | Facility: CLINIC | Age: 58
End: 2023-09-20
Payer: COMMERCIAL

## 2023-09-20 VITALS
OXYGEN SATURATION: 93 % | HEART RATE: 73 BPM | WEIGHT: 196.19 LBS | HEIGHT: 66 IN | DIASTOLIC BLOOD PRESSURE: 74 MMHG | SYSTOLIC BLOOD PRESSURE: 118 MMHG | BODY MASS INDEX: 31.53 KG/M2 | TEMPERATURE: 97 F

## 2023-09-20 DIAGNOSIS — Z00.00 ROUTINE GENERAL MEDICAL EXAMINATION AT A HEALTH CARE FACILITY: Primary | ICD-10-CM

## 2023-09-20 DIAGNOSIS — Z12.11 COLON CANCER SCREENING: ICD-10-CM

## 2023-09-20 DIAGNOSIS — R79.89 ABNORMAL TSH: ICD-10-CM

## 2023-09-20 DIAGNOSIS — I10 ESSENTIAL HYPERTENSION: ICD-10-CM

## 2023-09-20 DIAGNOSIS — Z12.31 ENCOUNTER FOR SCREENING MAMMOGRAM FOR BREAST CANCER: ICD-10-CM

## 2023-09-20 DIAGNOSIS — E55.9 VITAMIN D DEFICIENCY DISEASE: ICD-10-CM

## 2023-09-20 DIAGNOSIS — J34.89 NASAL SORE: ICD-10-CM

## 2023-09-20 DIAGNOSIS — U09.9 LONG COVID: ICD-10-CM

## 2023-09-20 DIAGNOSIS — R41.89 BRAIN FOG: ICD-10-CM

## 2023-09-20 PROCEDURE — 99999 PR PBB SHADOW E&M-EST. PATIENT-LVL IV: ICD-10-PCS | Mod: PBBFAC,,, | Performed by: FAMILY MEDICINE

## 2023-09-20 PROCEDURE — 99396 PR PREVENTIVE VISIT,EST,40-64: ICD-10-PCS | Mod: S$GLB,,, | Performed by: FAMILY MEDICINE

## 2023-09-20 PROCEDURE — 99396 PREV VISIT EST AGE 40-64: CPT | Mod: S$GLB,,, | Performed by: FAMILY MEDICINE

## 2023-09-20 PROCEDURE — 99999 PR PBB SHADOW E&M-EST. PATIENT-LVL IV: CPT | Mod: PBBFAC,,, | Performed by: FAMILY MEDICINE

## 2023-09-20 RX ORDER — ACYCLOVIR 50 MG/G
OINTMENT TOPICAL
Qty: 30 G | Refills: 1 | Status: SHIPPED | OUTPATIENT
Start: 2023-09-20

## 2023-09-20 RX ORDER — TRAZODONE HYDROCHLORIDE 50 MG/1
TABLET ORAL
Qty: 180 TABLET | Refills: 3 | Status: SHIPPED | OUTPATIENT
Start: 2023-09-20

## 2023-09-20 RX ORDER — FUROSEMIDE 20 MG/1
20 TABLET ORAL DAILY
Qty: 90 TABLET | Refills: 3 | Status: SHIPPED | OUTPATIENT
Start: 2023-09-20

## 2023-09-20 RX ORDER — OLMESARTAN MEDOXOMIL AND HYDROCHLOROTHIAZIDE 20/12.5 20; 12.5 MG/1; MG/1
1 TABLET ORAL DAILY
Qty: 90 TABLET | Refills: 3 | Status: SHIPPED | OUTPATIENT
Start: 2023-09-20

## 2023-09-20 RX ORDER — ACYCLOVIR 800 MG/1
800 TABLET ORAL 3 TIMES DAILY
Qty: 30 TABLET | Refills: 1 | Status: SHIPPED | OUTPATIENT
Start: 2023-09-20

## 2023-09-20 NOTE — PATIENT INSTRUCTIONS
ADD Testing and Counseling Services     [facility]  Dr. Autumn Paredes  [phone]  [email or web address]

## 2023-09-20 NOTE — PROGRESS NOTES
Subjective:      Patient ID: Makayla Neal is a 58 y.o. female.    Chief Complaint: Annual Exam      Patient is a 58 y.o. female coming in today for annual exam.   Today, pt reports dry nose sores for the past few days. Has been using antibiotic cream for treatment with some relief. She also reports brain fog and concentration issues onset 9 months ago shortly after getting over COVID infection. Reports normal bowel movements. She is due for colon cancer screening and mammogram. No other health concern at this time.       1. Routine general medical examination at a health care facility    2. Nasal sore    3. Brain fog    4. Long COVID    5. Abnormal TSH    6. Essential hypertension    7. Vitamin D deficiency disease    8. Encounter for screening mammogram for breast cancer    9. Colon cancer screening       [facility] Reason For Visit    9/19/2023  9:14 AM CDT - Filed by Patient   What is your primary reason for visit? Other/Annual   Have you experienced any of the following:   Change in activity? No   Unexpected weight change? No   Neck pain? No   Hearing loss? No   Runny nose? No   Trouble swallowing? No   Eye discharge? No   Changes in vision? No   Chest tightness? No   Wheezing? No   Chest pain? No   Heart beating fast or racing? No   Blood in stool? No   Constipation? No   Vomiting? No   Diarrhea? No   Drinking much more than usual? No   Urinating much more than usual? No   Difficulty urinating? No   Blood in the urine? No   Menstrual problem? No   Painful urination? No   Joint swelling? Yes   Joint pain? Yes   Headaches? No   Weakness? No   Confusion? No   Feeling depressed? No     [facility]    9/19/2023  9:16 AM CDT - Filed by Patient   On average, how many days per week do you engage in moderate to strenuous exercise (like a brisk walk)? 6 days   On average, how many minutes do you engage in exercise at this level? 20 min   Do you feel stress - tense, restless, nervous, or anxious, or unable to sleep at  night because your mind is troubled all the time - these days? Only a little   Do you belong to any clubs or organizations such as Jewish groups, unions, fraternal or athletic groups, or school groups? No   How often do you attend meetings of the clubs or organizations you belong to? Never   In a typical week, how many times do you talk on the phone with family, friends, or neighbors? More than three times a week   How often do you get together with friends or relatives? More than three times a week   Are you , , , , never , or living with a partner?    How hard is it for you to pay for the very basics like food, housing, medical care, and heating? Not hard at all   Within the past 12 months, you worried that your food would run out before you got the money to buy more. Never true   Within the past 12 months, the food you bought just didnt last and you didnt have money to get more. Never true   In the past 12 months, has lack of transportation kept you from medical appointments or from getting medications? No   In the past 12 months, has lack of transportation kept you from meetings, work, or from getting things needed for daily living? No   How often do you have a drink containing alcohol? 2-3 times a week   How many drinks containing alcohol do you have on a typical day when you are drinking? 1 or 2   How often do you have six or more drinks on one occasion? Never   In the last 12 months, was there a time when you were not able to pay the mortgage or rent on time? No   In the last 12 months, how many places have you lived? (range: at least 0) 1   In the last 12 months, was there a time when you did not have a steady place to sleep or slept in a shelter (including now)? No         Pmh, Psh, Family Hx, Social Hx, HM updated in Epic Tabs today.   Review of Systems   Constitutional:  Negative for activity change and unexpected weight change.   HENT:  Negative for hearing  "loss, rhinorrhea and trouble swallowing.    Eyes:  Negative for discharge and visual disturbance.   Respiratory:  Negative for chest tightness and wheezing.    Cardiovascular:  Negative for chest pain and palpitations.   Gastrointestinal:  Negative for blood in stool, constipation, diarrhea and vomiting.   Endocrine: Negative for polydipsia and polyuria.   Genitourinary:  Negative for difficulty urinating, dysuria, hematuria and menstrual problem.   Musculoskeletal:  Positive for arthralgias and joint swelling. Negative for neck pain.   Neurological:  Negative for weakness and headaches.   Psychiatric/Behavioral:  Negative for confusion and dysphoric mood.      Objective:     Vitals:    09/20/23 0747   BP: 118/74   Pulse: 73   Temp: 96.9 °F (36.1 °C)   SpO2: (!) 93%   Weight: 89 kg (196 lb 3.4 oz)   Height: 5' 6" (1.676 m)     Wt Readings from Last 10 Encounters:   09/20/23 89 kg (196 lb 3.4 oz)   09/28/22 85.7 kg (189 lb)   09/28/22 87.3 kg (192 lb 7.4 oz)   08/24/22 86 kg (189 lb 7.8 oz)   08/05/21 84 kg (185 lb 3 oz)   07/28/21 84.5 kg (186 lb 4.6 oz)   08/11/20 85.4 kg (188 lb 4.4 oz)   06/25/19 88.3 kg (194 lb 10.7 oz)   05/10/18 93.7 kg (206 lb 9.1 oz)   05/09/17 90 kg (198 lb 6.6 oz)     Physical Exam  Vitals reviewed.   Constitutional:       Appearance: Normal appearance. She is well-developed. She is obese.   HENT:      Head: Normocephalic and atraumatic.      Right Ear: Tympanic membrane and external ear normal.      Left Ear: Tympanic membrane and external ear normal.      Nose: Nose normal.      Comments: Reports bilateral nasal sores     Mouth/Throat:      Mouth: Mucous membranes are moist.      Pharynx: Oropharynx is clear.   Eyes:      Conjunctiva/sclera: Conjunctivae normal.      Pupils: Pupils are equal, round, and reactive to light.   Neck:      Thyroid: No thyromegaly.   Cardiovascular:      Rate and Rhythm: Normal rate and regular rhythm.      Heart sounds: Normal heart sounds. No murmur heard.   "   No friction rub. No gallop.   Pulmonary:      Effort: Pulmonary effort is normal. No respiratory distress.      Breath sounds: Normal breath sounds. No wheezing or rales.   Abdominal:      General: Bowel sounds are normal. There is no distension.      Palpations: Abdomen is soft.      Tenderness: There is no abdominal tenderness. There is no rebound.   Musculoskeletal:         General: Normal range of motion.      Cervical back: Normal range of motion and neck supple.   Lymphadenopathy:      Cervical: No cervical adenopathy.   Skin:     General: Skin is warm and dry.      Findings: No rash.   Neurological:      Mental Status: She is alert and oriented to person, place, and time.   Psychiatric:         Attention and Perception: Attention and perception normal.         Mood and Affect: Mood and affect normal.         Speech: Speech normal.         Behavior: Behavior normal.         Thought Content: Thought content normal.         Cognition and Memory: Cognition and memory normal.         Judgment: Judgment normal.         Assessment:     1. Routine general medical examination at a health care facility    2. Nasal sore    3. Brain fog    4. Long COVID    5. Abnormal TSH    6. Essential hypertension    7. Vitamin D deficiency disease    8. Encounter for screening mammogram for breast cancer    9. Colon cancer screening        Plan:   Makayla was seen today for annual exam.    Diagnoses and all orders for this visit:    Routine general medical examination at a health care facility  -     TSH; Future  -     T4, Free; Future  -     Lipid Panel; Future  -     Hemoglobin A1C; Future  -     Comprehensive Metabolic Panel; Future  -     CBC Auto Differential; Future  -     Insulin, Random; Future  -     Vitamin D; Future  -     Vitamin B12; Future  -     TSH  -     T4, Free  -     Lipid Panel  -     Hemoglobin A1C  -     Comprehensive Metabolic Panel  -     CBC Auto Differential  -     Insulin, Random  -     Vitamin D  -      Vitamin B12    Nasal sore  -     acyclovir 5% (ZOVIRAX) 5 % ointment; Apply topically 5 (five) times daily. Apply to nasal sore for 3-5 days  -     acyclovir (ZOVIRAX) 800 MG Tab; Take 1 tablet (800 mg total) by mouth 3 (three) times daily.    Brain fog  -     Ambulatory referral/consult to Psychology; Future  -     Vitamin D; Future  -     Vitamin B12; Future  -     Vitamin D  -     Vitamin B12    Long COVID  -     Ambulatory referral/consult to Psychology; Future    Abnormal TSH  -     TSH; Future  -     T4, Free; Future  -     TSH  -     T4, Free    Essential hypertension  -     TSH; Future  -     T4, Free; Future  -     Comprehensive Metabolic Panel; Future  -     TSH  -     T4, Free  -     Comprehensive Metabolic Panel  -     olmesartan-hydrochlorothiazide (BENICAR HCT) 20-12.5 mg per tablet; Take 1 tablet by mouth once daily.  -     furosemide (LASIX) 20 MG tablet; Take 1 tablet (20 mg total) by mouth once daily.    Vitamin D deficiency disease  -     Vitamin D; Future  -     Vitamin D    Encounter for screening mammogram for breast cancer  -     Mammo Digital Screening Bilat w/ Bran; Future    Colon cancer screening  -     Cologuard Screening (Multitarget Stool DNA); Future  -     Cologuard Screening (Multitarget Stool DNA)    Other orders  -     traZODone (DESYREL) 50 MG tablet; TAKE 1/2-2 TABLETS ( MG TOTAL) BY MOUTH NIGHTLY AS NEEDED FOR INSOMNIA.      Nasal sore and brain fog are new problem and is not properly controlled at this time.   New Rx Acyclovir 5% ointment 5 times for 3-5 days and Acyclovir 800 mg TID for nasal sore treatment.   [place] [facility] [place].   Other chronic conditions are stable. Instructed to continue as prescribed.   Refilled Rx Trazodone  mg/night as needed for insomnia treatment.   Refilled Rx Olmesartan-HCTZ 20-12.5 mg/day and Lasix 20 mg/day for HTN treatment.  Referral given to schedule mammogram.   Colorguard ordered for pt to  complete at home.   Instructed to f/u in 1 year.     Patient Instructions   ADD Testing and Counseling Services     NEUROMEDICAL CENTER  Dr. Autumn Paredes  [phone]  [email or web address]             Follow up in about 1 year (around 9/20/2024) for physical with Dr GAO.      LABS:   Lab Results   Component Value Date    HGBA1C 5.5 10/04/2022    HGBA1C 5.4 08/06/2021    HGBA1C 5.5 08/11/2020      Lab Results   Component Value Date    CHOL 233 (H) 10/04/2022    CHOL 236 (H) 08/06/2021    CHOL 262 (H) 08/11/2020     Lab Results   Component Value Date    LDLCALC 143 (H) 10/04/2022    LDLCALC 133 (H) 08/06/2021    LDLCALC 162.2 (H) 08/11/2020     Lab Results   Component Value Date    WBC 4.1 10/04/2022    HGB 11.6 (L) 10/04/2022    HCT 36.1 10/04/2022     10/04/2022    CHOL 233 (H) 10/04/2022    TRIG 81 10/04/2022    HDL 72 10/04/2022    ALT 13 10/04/2022    AST 17 10/04/2022     10/04/2022    K 4.6 10/04/2022     10/04/2022    CREATININE 0.83 10/04/2022    BUN 15 10/04/2022    CO2 33 (H) 10/04/2022    TSH 4.75 (H) 10/04/2022    INR 1.0 10/11/2016    HGBA1C 5.5 10/04/2022    MICROALBUR 0.5 10/04/2022       The 10-year ASCVD risk score (Carole GERMAIN, et al., 2019) is: 2.8%    Values used to calculate the score:      Age: 58 years      Sex: Female      Is Non- : No      Diabetic: No      Tobacco smoker: No      Systolic Blood Pressure: 118 mmHg      Is BP treated: Yes      HDL Cholesterol: 72 mg/dL      Total Cholesterol: 233 mg/dL  Mammo Digital Screening Bilat w/ Bran  Narrative: Result:  Mammo Digital Screening Bilat w/ Bran    History:  Patient is 57 y.o. and is seen for a screening mammogram.    Films Compared:  Compared to: 08/05/2021 Mammo Digital Screening Bilat w/ Bran     Findings:   This procedure was performed using tomosynthesis.   Computer-aided detection was utilized in  the interpretation of this   examination.    The breasts are almost entirely fatty. There is no evidence of suspicious   masses, microcalcifications or architectural distortion.  Impression:    No mammographic evidence of malignancy.    BI-RADS Category 1: Negative    Recommendation:  Routine screening mammogram in 1 year is recommended.    Your estimated lifetime risk of breast cancer (to age 85) based on   Tyrer-Cuzick risk assessment model is 16.05 %.  According to the American   Cancer Society, patients with a lifetime breast cancer risk of 20% or   higher might benefit from supplemental screening tests. ??     Scribe Attestation:   I, Mateo Soto, am scribing for, and in the presence of, Dr. Trish Phillips MD. I performed the above scribed service and the documentation accurately describes the services I performed. I attest to the accuracy of the note.    I, Dr. Trish Phillips MD, reviewed documentation as scribed above. I personally performed the services described in this documentation.  I agree that the record reflects my personal performance and is accurate and complete. Trish Phillips MD.    09/20/2023

## 2023-10-01 LAB
25(OH)D3 SERPL-MCNC: 53 NG/ML (ref 30–100)
ALBUMIN SERPL-MCNC: 4.1 G/DL (ref 3.6–5.1)
ALBUMIN/GLOB SERPL: 1.9 (CALC) (ref 1–2.5)
ALP SERPL-CCNC: 66 U/L (ref 37–153)
ALT SERPL-CCNC: 11 U/L (ref 6–29)
AST SERPL-CCNC: 16 U/L (ref 10–35)
BASOPHILS # BLD AUTO: 70 CELLS/UL (ref 0–200)
BASOPHILS NFR BLD AUTO: 1.6 %
BILIRUB SERPL-MCNC: 0.4 MG/DL (ref 0.2–1.2)
BUN SERPL-MCNC: 13 MG/DL (ref 7–25)
BUN/CREAT SERPL: NORMAL (CALC) (ref 6–22)
CALCIUM SERPL-MCNC: 9.2 MG/DL (ref 8.6–10.4)
CHLORIDE SERPL-SCNC: 101 MMOL/L (ref 98–110)
CHOLEST SERPL-MCNC: 240 MG/DL
CHOLEST/HDLC SERPL: 3.5 (CALC)
CO2 SERPL-SCNC: 30 MMOL/L (ref 20–32)
CREAT SERPL-MCNC: 0.79 MG/DL (ref 0.5–1.03)
EGFR: 87 ML/MIN/1.73M2
EOSINOPHIL # BLD AUTO: 119 CELLS/UL (ref 15–500)
EOSINOPHIL NFR BLD AUTO: 2.7 %
ERYTHROCYTE [DISTWIDTH] IN BLOOD BY AUTOMATED COUNT: 12.9 % (ref 11–15)
GLOBULIN SER CALC-MCNC: 2.2 G/DL (CALC) (ref 1.9–3.7)
GLUCOSE SERPL-MCNC: 84 MG/DL (ref 65–99)
HBA1C MFR BLD: 5.4 % OF TOTAL HGB
HCT VFR BLD AUTO: 36.3 % (ref 35–45)
HDLC SERPL-MCNC: 69 MG/DL
HGB BLD-MCNC: 12.1 G/DL (ref 11.7–15.5)
INSULIN SERPL-ACNC: 2.2 UIU/ML
LDLC SERPL CALC-MCNC: 147 MG/DL (CALC)
LYMPHOCYTES # BLD AUTO: 1800 CELLS/UL (ref 850–3900)
LYMPHOCYTES NFR BLD AUTO: 40.9 %
MCH RBC QN AUTO: 30.5 PG (ref 27–33)
MCHC RBC AUTO-ENTMCNC: 33.3 G/DL (ref 32–36)
MCV RBC AUTO: 91.4 FL (ref 80–100)
MONOCYTES # BLD AUTO: 471 CELLS/UL (ref 200–950)
MONOCYTES NFR BLD AUTO: 10.7 %
NEUTROPHILS # BLD AUTO: 1940 CELLS/UL (ref 1500–7800)
NEUTROPHILS NFR BLD AUTO: 44.1 %
NONHDLC SERPL-MCNC: 171 MG/DL (CALC)
PLATELET # BLD AUTO: 254 THOUSAND/UL (ref 140–400)
PMV BLD REES-ECKER: 11 FL (ref 7.5–12.5)
POTASSIUM SERPL-SCNC: 4.6 MMOL/L (ref 3.5–5.3)
PROT SERPL-MCNC: 6.3 G/DL (ref 6.1–8.1)
RBC # BLD AUTO: 3.97 MILLION/UL (ref 3.8–5.1)
SODIUM SERPL-SCNC: 139 MMOL/L (ref 135–146)
T4 FREE SERPL-MCNC: 1.1 NG/DL (ref 0.8–1.8)
TRIGL SERPL-MCNC: 122 MG/DL
TSH SERPL-ACNC: 4.63 MIU/L (ref 0.4–4.5)
VIT B12 SERPL-MCNC: 747 PG/ML (ref 200–1100)
WBC # BLD AUTO: 4.4 THOUSAND/UL (ref 3.8–10.8)

## 2023-10-02 NOTE — PROGRESS NOTES
Results of labs released to patient via patient portal.   Makayla Neal,    Your labs have been reviewed. Your cholesterol, sugar, vitamin D, insulin, blood counts, kidney functions, liver functions, and thyroid functions are within acceptable ranges for your age and conditions.  Any abnormalities that you see at this time are not indicative of needing additional workup or concern. The TSH is elevated, but the Free T4 is normal, thus no further workup indicated at this time.   Please continue on your current treatment plan. Please let me know if you have further questions.     Trish Phillips MD

## 2023-10-17 LAB — NONINV COLON CA DNA+OCC BLD SCRN STL QL: NEGATIVE

## 2023-10-19 NOTE — PROGRESS NOTES
Result of cologuard test sent to patient via patient portal of [facility].  Makayla,    Your cologuard colon cancer screen is negative. You will need to repeat this test again in 3 years. Please let me know if you have further questions.  Trish Phillips MD

## 2023-10-26 ENCOUNTER — HOSPITAL ENCOUNTER (OUTPATIENT)
Dept: RADIOLOGY | Facility: HOSPITAL | Age: 58
Discharge: HOME | End: 2023-10-26
Attending: FAMILY MEDICINE
Payer: COMMERCIAL

## 2023-10-26 DIAGNOSIS — Z12.31 ENCOUNTER FOR SCREENING MAMMOGRAM FOR BREAST CANCER: ICD-10-CM

## 2023-10-26 PROCEDURE — 77063 BREAST TOMOSYNTHESIS BI: CPT | Mod: 26,,, | Performed by: RADIOLOGY

## 2023-10-26 PROCEDURE — 77067 MAMMO DIGITAL SCREENING BILAT WITH TOMO: ICD-10-PCS | Mod: 26,,, | Performed by: RADIOLOGY

## 2023-10-26 PROCEDURE — 77067 SCR MAMMO BI INCL CAD: CPT | Mod: 26,,, | Performed by: RADIOLOGY

## 2023-10-26 PROCEDURE — 77063 MAMMO DIGITAL SCREENING BILAT WITH TOMO: ICD-10-PCS | Mod: 26,,, | Performed by: RADIOLOGY

## 2023-10-26 PROCEDURE — 77067 SCR MAMMO BI INCL CAD: CPT | Mod: TC

## 2023-11-13 ENCOUNTER — TELEPHONE (OUTPATIENT)
Dept: OBSTETRICS AND GYNECOLOGY | Facility: CLINIC | Age: 58
End: 2023-11-13
Payer: COMMERCIAL

## 2023-11-13 NOTE — TELEPHONE ENCOUNTER
LVM for pt to return call in regards to needing to reschedule apt due to [facility] canceling. Will send [employer] message.

## 2023-11-30 ENCOUNTER — OFFICE VISIT (OUTPATIENT)
Dept: OBSTETRICS AND GYNECOLOGY | Facility: CLINIC | Age: 58
End: 2023-11-30
Payer: COMMERCIAL

## 2023-11-30 VITALS — WEIGHT: 201.25 LBS | BODY MASS INDEX: 32.34 KG/M2 | HEIGHT: 66 IN

## 2023-11-30 DIAGNOSIS — Z01.419 ENCOUNTER FOR GYNECOLOGICAL EXAMINATION WITHOUT ABNORMAL FINDING: Primary | ICD-10-CM

## 2023-11-30 DIAGNOSIS — Z78.0 POSTMENOPAUSAL: ICD-10-CM

## 2023-11-30 PROCEDURE — 99396 PREV VISIT EST AGE 40-64: CPT | Mod: S$GLB,,, | Performed by: NURSE PRACTITIONER

## 2023-11-30 PROCEDURE — 99999 PR PBB SHADOW E&M-EST. PATIENT-LVL III: CPT | Mod: PBBFAC,,, | Performed by: NURSE PRACTITIONER

## 2023-11-30 PROCEDURE — 99999 PR PBB SHADOW E&M-EST. PATIENT-LVL III: ICD-10-PCS | Mod: PBBFAC,,, | Performed by: NURSE PRACTITIONER

## 2023-11-30 PROCEDURE — 99396 PR PREVENTIVE VISIT,EST,40-64: ICD-10-PCS | Mod: S$GLB,,, | Performed by: NURSE PRACTITIONER

## 2023-11-30 NOTE — PROGRESS NOTES
CC: Well woman exam    Makayla Neal is a 58 y.o. female  presents for well woman exam.  LMP: No LMP recorded. Patient is postmenopausal..   Pap ASCUS with negative HPV for 2022 - per guidelines - repeat with cotesting in 3 years  Mmg normal   Working from home for "[employer] firm  Lives in [facility]    Past Medical History:   Diagnosis Date    Allergic rhinitis     Chronic abdominal pain     Chronic back pain     Gallbladder attack     HTN (hypertension)     IBS (irritable bowel syndrome)     Menstrual migraine     Obesity      Past Surgical History:   Procedure Laterality Date    APPENDECTOMY      CHOLECYSTECTOMY  2012    DILATION AND CURETTAGE OF UTERUS      SPINE SURGERY  2016    TONSILLECTOMY, ADENOIDECTOMY       Social History     Socioeconomic History    Marital status:    Occupational History     Employer: [employer]   Tobacco Use    Smoking status: Never    Smokeless tobacco: Never   Substance and Sexual Activity    Alcohol use: Yes     Alcohol/week: 2.0 standard drinks of alcohol     Types: 2 Glasses of wine per week    Drug use: Never    Sexual activity: Yes     Partners: Male     Birth control/protection: Post-menopausal     Social Determinants of Health     Financial Resource Strain: Low Risk  (2023)    Overall Financial Resource Strain (CARDIA)     Difficulty of Paying Living Expenses: Not hard at all   Food Insecurity: No Food Insecurity (2023)    Hunger Vital Sign     Worried About Running Out of Food in the Last Year: Never true     Ran Out of Food in the Last Year: Never true   Transportation Needs: No Transportation Needs (2023)    PRAPARE - Transportation     Lack of Transportation (Medical): No     Lack of Transportation (Non-Medical): No   Physical Activity: Sufficiently Active (2023)    Exercise Vital Sign     Days of Exercise per Week: 5 days     Minutes of Exercise per Session: 30 min   Recent Concern: Physical Activity -  "Insufficiently Active (2023)    Exercise Vital Sign     Days of Exercise per Week: 6 days     Minutes of Exercise per Session: 20 min   Stress: Stress Concern Present (2023)    [place] [employer] - Occupational Stress Questionnaire     Feeling of Stress : To some extent   Social Connections: Unknown (2023)    Social Connection and Isolation Panel [NHANES]     Frequency of Communication with Friends and Family: More than three times a week     Frequency of Social Gatherings with Friends and Family: More than three times a week     Active Member of Clubs or Organizations: No     Attends Club or Organization Meetings: Never     Marital Status:    Housing Stability: Low Risk  (2023)    Housing Stability Vital Sign     Unable to Pay for Housing in the Last Year: No     Number of Places Lived in the Last Year: 1     Unstable Housing in the Last Year: No     Family History   Problem Relation Age of Onset    Hypertension Mother     Deep vein thrombosis Mother         while on ocps    COPD Mother     Stroke Mother         Mild stroke    Heart attack Father 55    Diabetes Father     Hyperlipidemia Father     Heart disease Father         Heart Attack before age 55    Kidney disease Father     Heart attack Sister 53    Heart disease Sister         Heart Attack before age 55    Breast cancer Sister 59        smoker, no children    Pacemaker/defibrilator Sister     Cancer Sister         Breast    COPD Sister      OB History          1    Para   1    Term   1            AB        Living   1         SAB        IAB        Ectopic        Multiple        Live Births                     Ht 5' 6" (1.676 m)   Wt 91.3 kg (201 lb 4.5 oz)   BMI 32.49 kg/m²       ROS:  GENERAL: Denies weight gain or weight loss. Feeling well overall.   SKIN: Denies rash or lesions.   HEAD: Denies head injury or headache.   NODES: Denies enlarged lymph nodes.   CHEST: Denies chest pain or " shortness of breath.   CARDIOVASCULAR: Denies palpitations or left sided chest pain.   ABDOMEN: No abdominal pain, constipation, diarrhea, nausea, vomiting or rectal bleeding.   URINARY: No frequency, dysuria, hematuria, or burning on urination.  REPRODUCTIVE: See HPI.   BREASTS: The patient performs breast self-examination and denies pain, lumps, or nipple discharge.   HEMATOLOGIC: No easy bruisability or excessive bleeding.   MUSCULOSKELETAL: Denies joint pain or swelling.   NEUROLOGIC: Denies syncope or weakness.   PSYCHIATRIC: Denies depression, anxiety or mood swings.    PHYSICAL EXAM:  APPEARANCE: Well nourished, well developed, in no acute distress.  AFFECT: WNL, alert and oriented x 3  SKIN: No acne or hirsutism  NECK: Neck symmetric without masses or thyromegaly  NODES: No inguinal, cervical, axillary, or femoral lymph node enlargement  CHEST: Good respiratory effect  ABDOMEN: Soft.  No tenderness or masses.  No hepatosplenomegaly.  No hernias.  BREASTS: Symmetrical, no skin changes or visible lesions.  No palpable masses, nipple discharge bilaterally.  PELVIC: Normal external genitalia without lesions.  Normal hair distribution.  Adequate perineal body, normal urethral meatus.  Vagina moist and well rugated without lesions or discharge.  Cervix pink, without lesions, discharge or tenderness.  No significant cystocele or rectocele.  Bimanual exam shows uterus to be normal size, regular, mobile and nontender.  Adnexa without masses or tenderness.    EXTREMITIES: No edema.  Physical Exam    1. Encounter for gynecological examination without abnormal finding        2. Postmenopausal         AND PLAN:  Makayla was seen today for well woman.    Diagnoses and all orders for this visit:    Encounter for gynecological examination without abnormal finding    Postmenopausal       Patient was counseled today on A.C.S. Pap guidelines and recommendations for yearly pelvic exams, mammograms and monthly self breast exams; to  see her PCP for other health maintenance.

## 2023-12-13 ENCOUNTER — OFFICE VISIT (OUTPATIENT)
Dept: PRIMARY CARE CLINIC | Facility: CLINIC | Age: 58
End: 2023-12-13
Payer: COMMERCIAL

## 2023-12-13 VITALS
SYSTOLIC BLOOD PRESSURE: 120 MMHG | HEIGHT: 65 IN | WEIGHT: 199.63 LBS | TEMPERATURE: 96 F | RESPIRATION RATE: 19 BRPM | DIASTOLIC BLOOD PRESSURE: 70 MMHG | HEART RATE: 71 BPM | BODY MASS INDEX: 33.26 KG/M2 | OXYGEN SATURATION: 97 %

## 2023-12-13 DIAGNOSIS — I10 ESSENTIAL HYPERTENSION: Primary | ICD-10-CM

## 2023-12-13 DIAGNOSIS — R73.03 PREDIABETES: ICD-10-CM

## 2023-12-13 PROCEDURE — 99999 PR PBB SHADOW E&M-EST. PATIENT-LVL V: ICD-10-PCS | Mod: PBBFAC,,, | Performed by: FAMILY MEDICINE

## 2023-12-13 PROCEDURE — 99214 OFFICE O/P EST MOD 30 MIN: CPT | Mod: S$GLB,,, | Performed by: FAMILY MEDICINE

## 2023-12-13 PROCEDURE — 99999 PR PBB SHADOW E&M-EST. PATIENT-LVL V: CPT | Mod: PBBFAC,,, | Performed by: FAMILY MEDICINE

## 2023-12-13 PROCEDURE — 99214 PR OFFICE/OUTPT VISIT, EST, LEVL IV, 30-39 MIN: ICD-10-PCS | Mod: S$GLB,,, | Performed by: FAMILY MEDICINE

## 2023-12-13 RX ORDER — SEMAGLUTIDE 0.25 MG/.5ML
0.25 INJECTION, SOLUTION SUBCUTANEOUS
Qty: 12 EACH | Refills: 0 | Status: SHIPPED | OUTPATIENT
Start: 2023-12-13 | End: 2024-02-01

## 2023-12-13 NOTE — PATIENT INSTRUCTIONS
Zepbound     [facility] CARL[facility] - [address] [facility] AT Portal to Registered [facility] Sites  P: [phone]  F: [phone]  Address    [address] [facility]   [place] [address]   Near the intersection of: Portal to Registered [facility] Sites    Operation       Hours: Not open 24 hours   E-Prescribing   E-Prescribing controlled substances   Mode: Mail Order   Type: External          [place] Pharmacies to look into:    Its [email or web address]    [facility] ( [email or web address]/)  Fax - Send to [phone]  Text - Send to [phone]    [facility] ( www.[employer]) [phone]     Compounding Pharmacies out of [place] :     [facility] ( [email or web address] )    [facility]

## 2023-12-13 NOTE — PROGRESS NOTES
"Subjective:      Patient ID: Makayla Neal is a 58 y.o. female.    Chief Complaint: discuss weight loss      Patient is a 58 y.o. female coming in today for : wt loss options   Patient is interested in trying some prescription weight loss options and medically monitored.  She has been doing low carb dieting.  She has also been doing regular routine exercise.  She does have risk factors including hypertension for which she is on medications and controlled hyperlipidemia as well as a history of prediabetes.  She has a BMI at 33 currently.      1. Essential hypertension    2. BMI 33.0-33.9,adult    3. Prediabetes       No questionnaires on file.    Pmh, Psh, Family Hx, Social Hx, HM updated in Epic Tabs today.   Review of Systems   Constitutional:  Positive for appetite change and unexpected weight change. Negative for activity change and fatigue.   Respiratory:  Negative for cough and shortness of breath.    Cardiovascular:  Negative for chest pain and palpitations.   Gastrointestinal:  Negative for abdominal distention and abdominal pain.   Psychiatric/Behavioral:  Negative for dysphoric mood and sleep disturbance. The patient is not nervous/anxious.      Objective:     Vitals:    12/13/23 0919   BP: 120/70   BP Location: Left arm   Patient Position: Sitting   BP Method: Small (Manual)   Pulse: 71   Resp: 19   Temp: 96 °F (35.6 °C)   TempSrc: Tympanic   SpO2: 97%   Weight: 90.5 kg (199 lb 10 oz)   Height: 5' 5" (1.651 m)     Wt Readings from Last 10 Encounters:   12/13/23 90.5 kg (199 lb 10 oz)   11/30/23 91.3 kg (201 lb 4.5 oz)   09/20/23 89 kg (196 lb 3.4 oz)   09/28/22 85.7 kg (189 lb)   09/28/22 87.3 kg (192 lb 7.4 oz)   08/24/22 86 kg (189 lb 7.8 oz)   08/05/21 84 kg (185 lb 3 oz)   07/28/21 84.5 kg (186 lb 4.6 oz)   08/11/20 85.4 kg (188 lb 4.4 oz)   06/25/19 88.3 kg (194 lb 10.7 oz)     Physical Exam  Vitals reviewed.   Constitutional:       Appearance: Normal appearance. She is obese.   HENT:      Head: " Normocephalic and atraumatic.   Neurological:      General: No focal deficit present.      Mental Status: She is alert. Mental status is at baseline.   Psychiatric:         Mood and Affect: Mood normal.         Behavior: Behavior normal.         Thought Content: Thought content normal.         Judgment: Judgment normal.         Assessment:     1. Essential hypertension    2. BMI 33.0-33.9,adult    3. Prediabetes        Plan:   Makayla was seen today for discuss weight loss.    Diagnoses and all orders for this visit:    Essential hypertension  -     semaglutide, weight loss, (WEGOVY) 0.25 mg/0.5 mL PnIj; Inject 0.25 mg into the skin every 7 days.  -     Ambulatory referral/consult to [facility]; Future    BMI 33.0-33.9,adult  -     semaglutide, weight loss, (WEGOVY) 0.25 mg/0.5 mL PnIj; Inject 0.25 mg into the skin every 7 days.  -     Ambulatory referral/consult to [facility]; Future    Prediabetes  -     semaglutide, weight loss, (WEGOVY) 0.25 mg/0.5 mL PnIj; Inject 0.25 mg into the skin every 7 days.  -     Ambulatory referral/consult to [facility]; Future    Exercise and weight loss options including pharmaceutical options.  Patient is interested in GLP-1 injections.  Sent in Wegovy starter dose to [facility].  Completed electronic prior authorization with the patient in the room today.  Can also look into other alternatives did provide lifestyle wellness clinic referral as well.  Follow-up once patient is able to make a decision on medication and seeing about insurance approval.  She has done diet with low carb dieting, less than 1800 calorie diet, routine exercise at 5 days a week, and behavioral modifications without significant success with her weight loss.    Patient Instructions   Zepbound     [facility] - JOSE CARLOS Santiago - One [facility] AT Portal to Registered [facility] Sites  P: [phone]  F: [phone]  Address    One  [facility]   [place] [address]   Near the intersection of: Portal to Registered [employer] Sites    Operation       Hours: Not open 24 hours   E-Prescribing   E-Prescribing controlled substances   Mode: Mail Order   Type: External          [place] Pharmacies to look into:    Its [email or web address]    [facility] ( [email or web address]/)  Fax - Send to [phone]  Text - Send to [phone]    [facility] ( www.[employer]) [phone]     Compounding Pharmacies out of [place] :     [facility] ( [email or web address] )    [facility]      No follow-ups on file.      LABS:   Lab Results   Component Value Date    HGBA1C 5.4 09/29/2023    HGBA1C 5.5 10/04/2022    HGBA1C 5.4 08/06/2021      Lab Results   Component Value Date    CHOL 240 (H) 09/29/2023    CHOL 233 (H) 10/04/2022    CHOL 236 (H) 08/06/2021     Lab Results   Component Value Date    LDLCALC 147 (H) 09/29/2023    LDLCALC 143 (H) 10/04/2022    LDLCALC 133 (H) 08/06/2021     Lab Results   Component Value Date    WBC 4.4 09/29/2023    HGB 12.1 09/29/2023    HCT 36.3 09/29/2023     09/29/2023    CHOL 240 (H) 09/29/2023    TRIG 122 09/29/2023    HDL 69 09/29/2023    ALT 11 09/29/2023    AST 16 09/29/2023     09/29/2023    K 4.6 09/29/2023     09/29/2023    CREATININE 0.79 09/29/2023    BUN 13 09/29/2023    CO2 30 09/29/2023    TSH 4.63 (H) 09/29/2023    INR 1.0 10/11/2016    HGBA1C 5.4 09/29/2023    MICROALBUR 0.5 10/04/2022       The 10-year ASCVD risk score (Carole GERMAIN, et al., 2019) is: 3.1%    Values used to calculate the score:      Age: 58 years      Sex: Female      Is Non- : No      Diabetic: No      Tobacco smoker: No      Systolic Blood Pressure: 120 mmHg      Is BP treated: Yes      HDL Cholesterol: 69 mg/dL      Total Cholesterol: 240 mg/dL    30 minutes of total time spent on the encounter, which includes face to face time and non-face to face time preparing  to see the patient (eg, review of tests), Obtaining and/or reviewing separately obtained history, Documenting clinical information in the electronic or other health record, Independently interpreting results (not separately reported) and communicating results to the patient/family/caregiver, or Care coordination (not separately reported).

## 2024-02-01 DIAGNOSIS — I10 ESSENTIAL HYPERTENSION: ICD-10-CM

## 2024-02-01 DIAGNOSIS — R73.03 PREDIABETES: ICD-10-CM

## 2024-02-01 RX ORDER — SEMAGLUTIDE 0.25 MG/.5ML
INJECTION, SOLUTION SUBCUTANEOUS
Qty: 12 EACH | Refills: 0 | Status: SHIPPED | OUTPATIENT
Start: 2024-02-01 | End: 2024-05-21

## 2024-02-01 NOTE — TELEPHONE ENCOUNTER
Was patient able to get this filled? Typically we go up on the dose for weight loss. Is patient wanting to stay at the same dose or wanting to increase her dose? If increasing, then let's do a video visit to make sure to see how she is doing before increasing

## 2024-02-01 NOTE — TELEPHONE ENCOUNTER
Refill Routing Note   Medication(s) are not appropriate for processing by [facility] for the following reason(s):        New or recently adjusted medication    [facility] action(s):  Defer   Requires labs : Yes             Appointments  past 12m or future 3m with PCP    Date Provider   Last Visit   12/13/2023 Trish Phillips MD   Next Visit   Visit date not found Trish Phillips MD   ED visits in past 90 days: 0        Note composed:11:16 AM 02/01/2024

## 2024-02-01 NOTE — TELEPHONE ENCOUNTER
Care Due:                  Date            Visit Type   Department     Provider  --------------------------------------------------------------------------------                                MYCHART                              FOLLOWUP/OF  [facility] PRIMARY  Last Visit: 12-      FICE VISIT   CARE           Trish Phillips                              EP -                              PRIMARY      [facility]  Next Visit: 09-      CARE ([facility])   CARE           Trish Phillips                                                            Last  Test          Frequency    Reason                     Performed    Due Date  --------------------------------------------------------------------------------    HBA1C.......  6 months...  semaglutide,.............  10-   03-    [facility] Embedded Care Due Messages. Reference number: [id number].   2/01/2024 7:42:50 AM CST

## 2024-05-20 DIAGNOSIS — R73.03 PREDIABETES: ICD-10-CM

## 2024-05-20 DIAGNOSIS — I10 ESSENTIAL HYPERTENSION: ICD-10-CM

## 2024-05-20 NOTE — TELEPHONE ENCOUNTER
Care Due:                  Date            Visit Type   Department     Provider  --------------------------------------------------------------------------------                                MYCHART                              FOLLOWUP/OF  [facility] PRIMARY  Last Visit: 12-      FICE VISIT   CARE           Trish Phillips                              EP -                              PRIMARY      [facility]  Next Visit: 09-      CARE ([facility])   CARE           Trish Phillips                                                            Last  Test          Frequency    Reason                     Performed    Due Date  --------------------------------------------------------------------------------    HBA1C.......  6 months...  WEGOVY...................  10-   03-    [facility] Embedded Care Due Messages. Reference number: [id number].   5/20/2024 10:38:20 AM CDT

## 2024-05-21 ENCOUNTER — PATIENT MESSAGE (OUTPATIENT)
Dept: PRIMARY CARE CLINIC | Facility: CLINIC | Age: 59
End: 2024-05-21

## 2024-05-21 ENCOUNTER — OFFICE VISIT (OUTPATIENT)
Dept: PRIMARY CARE CLINIC | Facility: CLINIC | Age: 59
End: 2024-05-21
Payer: COMMERCIAL

## 2024-05-21 DIAGNOSIS — R73.03 PREDIABETES: Primary | ICD-10-CM

## 2024-05-21 DIAGNOSIS — I10 ESSENTIAL HYPERTENSION: ICD-10-CM

## 2024-05-21 PROCEDURE — 99212 OFFICE O/P EST SF 10 MIN: CPT | Mod: 95,,, | Performed by: NURSE PRACTITIONER

## 2024-05-21 RX ORDER — SEMAGLUTIDE 0.25 MG/.5ML
0.25 INJECTION, SOLUTION SUBCUTANEOUS
Qty: 12 EACH | Refills: 0 | Status: SHIPPED | OUTPATIENT
Start: 2024-05-21 | End: 2024-05-23 | Stop reason: SDUPTHER

## 2024-05-21 RX ORDER — SEMAGLUTIDE 0.25 MG/.5ML
INJECTION, SOLUTION SUBCUTANEOUS
Qty: 6 ML | Refills: 0 | OUTPATIENT
Start: 2024-05-21

## 2024-05-21 NOTE — PROGRESS NOTES
Assessment & Plan  Problem List Items Addressed This Visit          Cardiac/Vascular    Essential hypertension  - As below    Relevant Medications    semaglutide, weight loss, (WEGOVY) 0.25 mg/0.5 mL PnIj       Renal/    Early menopause     Other Visit Diagnoses       Prediabetes    -  Primary  - Patient deferred hepatic and renal screening at this time. Prefers to wait unto four month Routine Physical Exam visit    Relevant Medications    semaglutide, weight loss, (WEGOVY) 0.25 mg/0.5 mL PnIj    BMI 33.0-33.9,adult      - As below    Relevant Medications    semaglutide, weight loss, (WEGOVY) 0.25 mg/0.5 mL PnIj              Health Maintenance reviewed: Deferred at this time.    The patient location is:  Patient appears to be home or in an office space.  The chief complaint leading to consultation is: noted below  Visit type: Virtual visit with synchronous audio and video  Total time spent with patient: 10+ minutes  Each patient to whom he or she provides medical services by telemedicine is:  (1) informed of the relationship between the physician and patient and the respective role of any other health care provider with respect to management of the patient; and (2) notified that he or she may decline to receive medical services by telemedicine and may withdraw from such care at any time.    Follow-up: Follow-up with Dr. Phillips in approx four months as previously schedule.    Sincerely,  Lawrence Carson NP     Chief Complaint  No chief complaint on file.      HPI  Makayla Neal is a 59 y.o. female with multiple medical diagnoses as listed in the medical history and problem list that presents for GLP1 Monitoring and hx of PreDiabetes via virtual visit. This patient is new to me.    GLP1 Monitoring and hx of PreDiabetes: Stable on Wegovy 0.25 mg weekly injections. Denies side effects or adverse reactions. Explains that the current dosage works well for her. She doesn't feel as though she needs to increase dosage at this  time. Last renal and hepatic functio approx eight months ago that were within recommended range. No acute concerns today.        ROS  Review of Systems   Constitutional:  Negative for activity change and unexpected weight change.   HENT:  Negative for hearing loss, rhinorrhea and trouble swallowing.    Eyes:  Negative for discharge and visual disturbance.   Respiratory:  Negative for chest tightness and wheezing.    Cardiovascular:  Negative for chest pain and palpitations.   Gastrointestinal:  Negative for blood in stool, constipation, diarrhea and vomiting.   Endocrine: Negative for polydipsia and polyuria.   Genitourinary:  Negative for difficulty urinating, dysuria, hematuria and menstrual problem.   Musculoskeletal:  Negative for arthralgias, joint swelling and neck pain.   Neurological:  Negative for weakness and headaches.   Psychiatric/Behavioral:  Negative for confusion and dysphoric mood.            Physical Exam  Physical Exam  Constitutional:       Appearance: Normal appearance.   HENT:      Head: Normocephalic and atraumatic.   Pulmonary:      Effort: Pulmonary effort is normal.   Neurological:      Mental Status: She is alert and oriented to person, place, and time.   Psychiatric:         Mood and Affect: Mood normal.

## 2024-05-21 NOTE — TELEPHONE ENCOUNTER
Refill Routing Note   Medication(s) are not appropriate for processing by [facility] for the following reason(s):        Required labs outdated    [facility] action(s):  Defer     Requires labs : Yes             Appointments  past 12m or future 3m with PCP    Date Provider   Last Visit   12/13/2023 Trish Phillips MD   Next Visit   9/23/2024 Trish Phillips MD   ED visits in past 90 days: 0        Note composed:6:52 AM 05/21/2024

## 2024-05-23 DIAGNOSIS — I10 ESSENTIAL HYPERTENSION: ICD-10-CM

## 2024-05-23 DIAGNOSIS — R73.03 PREDIABETES: ICD-10-CM

## 2024-05-23 RX ORDER — SEMAGLUTIDE 0.25 MG/.5ML
0.25 INJECTION, SOLUTION SUBCUTANEOUS
Qty: 12 EACH | Refills: 0 | OUTPATIENT
Start: 2024-05-23

## 2024-05-23 NOTE — TELEPHONE ENCOUNTER
No care due was identified.  [facility] Embedded Care Due Messages. Reference number: [id number].   5/23/2024 8:41:31 AM CDT

## 2024-05-28 DIAGNOSIS — I10 ESSENTIAL HYPERTENSION: ICD-10-CM

## 2024-05-28 DIAGNOSIS — R73.03 PREDIABETES: ICD-10-CM

## 2024-05-28 RX ORDER — SEMAGLUTIDE 0.25 MG/.5ML
0.25 INJECTION, SOLUTION SUBCUTANEOUS
Qty: 12 EACH | Refills: 0 | Status: SHIPPED | OUTPATIENT
Start: 2024-05-28

## 2024-05-28 RX ORDER — SEMAGLUTIDE 0.25 MG/.5ML
0.25 INJECTION, SOLUTION SUBCUTANEOUS
Qty: 12 EACH | Refills: 0 | Status: CANCELLED | OUTPATIENT
Start: 2024-05-28

## 2024-05-30 ENCOUNTER — OFFICE VISIT (OUTPATIENT)
Dept: DERMATOLOGY | Facility: CLINIC | Age: 59
End: 2024-05-30
Payer: COMMERCIAL

## 2024-05-30 VITALS — BODY MASS INDEX: 33.24 KG/M2 | WEIGHT: 199.5 LBS | HEIGHT: 65 IN

## 2024-05-30 DIAGNOSIS — L30.9 DERMATITIS: Primary | ICD-10-CM

## 2024-05-30 PROCEDURE — 99214 OFFICE O/P EST MOD 30 MIN: CPT | Mod: S$GLB,,, | Performed by: STUDENT IN AN ORGANIZED HEALTH CARE EDUCATION/TRAINING PROGRAM

## 2024-05-30 PROCEDURE — G2211 COMPLEX E/M VISIT ADD ON: HCPCS | Mod: S$GLB,,, | Performed by: STUDENT IN AN ORGANIZED HEALTH CARE EDUCATION/TRAINING PROGRAM

## 2024-05-30 PROCEDURE — 99999 PR PBB SHADOW E&M-EST. PATIENT-LVL III: CPT | Mod: PBBFAC,,, | Performed by: STUDENT IN AN ORGANIZED HEALTH CARE EDUCATION/TRAINING PROGRAM

## 2024-05-30 RX ORDER — MOMETASONE FUROATE 1 MG/G
OINTMENT TOPICAL DAILY
Qty: 45 G | Refills: 2 | Status: SHIPPED | OUTPATIENT
Start: 2024-05-30

## 2024-05-30 NOTE — PROGRESS NOTES
Subjective:       Patient ID:  Makayla Neal is a 59 y.o. female who presents for   Chief Complaint   Patient presents with    Eczema    Spot    Itching     History of Present Illness: The patient presents with chief complaint of a spreading rash.  Location: mostly on the arms, trunk and now on the face  Duration: ongoing for several weeks  Signs/Symptoms: reports having red, dry, itchy and irritated rash on the skin  Prior treatments: none. Denies any new medications, contacts/exposures.       Eczema    Spot    Itching        Review of Systems   Constitutional:  Negative for fever and chills.   Skin:  Positive for itching, rash and dry skin.        Objective:    Physical Exam   Constitutional: She appears well-developed and well-nourished. No distress.   Neurological: She is alert and oriented to person, place, and time. She is not disoriented.   Psychiatric: She has a normal mood and affect.   Skin:   Areas Examined (abnormalities noted in diagram):   Head / Face Inspection Performed  Neck Inspection Performed  Chest / Axilla Inspection Performed  Abdomen Inspection Performed  Back Inspection Performed  RUE Inspected  LUE Inspection Performed                   Diagram Legend     Erythematous scaling macule/papule c/w actinic keratosis       Vascular papule c/w angioma      Pigmented verrucoid papule/plaque c/w seborrheic keratosis      Yellow umbilicated papule c/w sebaceous hyperplasia      Irregularly shaped tan macule c/w lentigo     1-2 mm smooth white papules consistent with Milia      Movable subcutaneous cyst with punctum c/w epidermal inclusion cyst      Subcutaneous movable cyst c/w pilar cyst      Firm pink to brown papule c/w dermatofibroma      Pedunculated fleshy papule(s) c/w skin tag(s)      Evenly pigmented macule c/w junctional nevus     Mildly variegated pigmented, slightly irregular-bordered macule c/w mildly atypical nevus      Flesh colored to evenly pigmented papule c/w intradermal nevus        Pink pearly papule/plaque c/w basal cell carcinoma      Erythematous hyperkeratotic cursted plaque c/w SCC      Surgical scar with no sign of skin cancer recurrence      Open and closed comedones      Inflammatory papules and pustules      Verrucoid papule consistent consistent with wart     Erythematous eczematous patches and plaques     Dystrophic onycholytic nail with subungual debris c/w onychomycosis     Umbilicated papule    Erythematous-base heme-crusted tan verrucoid plaque consistent with inflamed seborrheic keratosis     Erythematous Silvery Scaling Plaque c/w Psoriasis     See annotation      Assessment / Plan:        Dermatitis  -     mometasone (ELOCON) 0.1 % ointment; Apply topically once daily.  Dispense: 45 g; Refill: 2  -      Counseled patient on gentle skin care regimen, including need for sensitive soaps/detergents, as well as need for frequent use of sensitize moisturizers.            Follow up in about 6 weeks (around 7/11/2024).

## 2024-07-22 ENCOUNTER — PATIENT MESSAGE (OUTPATIENT)
Dept: PRIMARY CARE CLINIC | Facility: CLINIC | Age: 59
End: 2024-07-22
Payer: COMMERCIAL

## 2024-07-25 ENCOUNTER — TELEPHONE (OUTPATIENT)
Dept: PRIMARY CARE CLINIC | Facility: CLINIC | Age: 59
End: 2024-07-25
Payer: COMMERCIAL

## 2024-07-25 NOTE — TELEPHONE ENCOUNTER
On her behalf I have called matt twice and left messages both times waiting on a response from him so this can get approved

## 2024-07-30 ENCOUNTER — TELEPHONE (OUTPATIENT)
Dept: PRIMARY CARE CLINIC | Facility: CLINIC | Age: 59
End: 2024-07-30
Payer: COMMERCIAL

## 2024-07-30 NOTE — TELEPHONE ENCOUNTER
----- Message from Bee Ann sent at 7/30/2024 11:24 AM CDT -----  Regarding: Missed call  Type:  Patient Returning Call    Who Called:Makayla   Who Left Message for Patient:Tawanna  Does the patient know what this is regarding?:No  Would the patient rather a call back or a response via [facility]? Call Back   Best Call Back Number:[phone]  Additional Information:

## 2024-07-30 NOTE — TELEPHONE ENCOUNTER
----- Message from Armando Lorenzo sent at 7/30/2024  3:38 PM CDT -----  Contact: Makayla  Type:  Patient Returning Call    Who Called:Makayla  Who Left Message for Patient:Tawanna  Does the patient know what this is regarding?:missed call  Would the patient rather a call back or a response via [facility]? call  Best Call Back Number:[phone]   Additional Information:

## 2024-07-30 NOTE — TELEPHONE ENCOUNTER
----- Message from Bee Ann sent at 7/30/2024 11:24 AM CDT -----  Regarding: Missed call  Type:  Patient Returning Call    Who Called:Makayla   Who Left Message for Patient:Tawanna  Does the patient know what this is regarding?:No  Would the patient rather a call back or a response via [facility]? Call Back   Best Call Back Number:[phone]  Additional Information:

## 2024-08-01 ENCOUNTER — TELEPHONE (OUTPATIENT)
Dept: PRIMARY CARE CLINIC | Facility: CLINIC | Age: 59
End: 2024-08-01
Payer: COMMERCIAL

## 2024-08-01 NOTE — TELEPHONE ENCOUNTER
----- Message from Jesus Peres sent at 8/1/2024 11:22 AM CDT -----  Regarding: Zena Calzada  Type: Patient Call Back     What is the request in detail: Please call zena mendoza with info on the appeal for the pts wegovy medication     Can the clinic reply by [facility]? No     Would the patient rather a call back or a response via [facility]? Call back    Best call back number:[phone] secure  please leave a detailed message     Additional Information:    Thank you.

## 2024-08-02 ENCOUNTER — PATIENT MESSAGE (OUTPATIENT)
Dept: PRIMARY CARE CLINIC | Facility: CLINIC | Age: 59
End: 2024-08-02
Payer: COMMERCIAL

## 2024-08-12 ENCOUNTER — PATIENT MESSAGE (OUTPATIENT)
Dept: PRIMARY CARE CLINIC | Facility: CLINIC | Age: 59
End: 2024-08-12
Payer: COMMERCIAL

## 2024-08-12 DIAGNOSIS — E66.9 OBESITY WITH BODY MASS INDEX (BMI) OF 30.0 TO 39.9: Primary | ICD-10-CM

## 2024-08-12 DIAGNOSIS — R73.03 PREDIABETES: ICD-10-CM

## 2024-08-12 DIAGNOSIS — I10 ESSENTIAL HYPERTENSION: ICD-10-CM

## 2024-08-13 DIAGNOSIS — E66.9 OBESITY WITH BODY MASS INDEX (BMI) OF 30.0 TO 39.9: ICD-10-CM

## 2024-08-13 RX ORDER — SEMAGLUTIDE 0.25 MG/.5ML
0.25 INJECTION, SOLUTION SUBCUTANEOUS
Qty: 2 ML | Refills: 5 | Status: SHIPPED | OUTPATIENT
Start: 2024-08-13

## 2024-08-13 RX ORDER — SEMAGLUTIDE 0.25 MG/.5ML
0.25 INJECTION, SOLUTION SUBCUTANEOUS
Qty: 2 ML | Refills: 5 | Status: SHIPPED | OUTPATIENT
Start: 2024-08-13 | End: 2024-08-13 | Stop reason: SDUPTHER

## 2024-08-13 NOTE — PROGRESS NOTES
Makayla Neal,     I have sent the following medications to your preferred pharmacy on file. Please let me know if you have further questions.     Medications Ordered This Encounter   Medications    semaglutide, weight loss, (WEGOVY) 0.25 mg/0.5 mL PnIj     Sig: Inject 0.25 mg into the skin every 7 days.     Dispense:  2 mL     Refill:  5        [facility] - [place] - [facility] AT [facility]  [facility]  [place] [address]  Phone: [phone] Fax: [phone]       Sincerely,   Trish Phillips MD

## 2024-08-13 NOTE — PROGRESS NOTES
Printed rx for wegovy to submit to [facility] since not going through electronically.

## 2024-08-13 NOTE — TELEPHONE ENCOUNTER
Tawanna,     Please call this patient's [facility] caremark and give them the additional information that the patient's wegovy should have been submitted with the code for obesity and BMI 33. (E66.9)     And NOT for prediabetes.     She has been on this medication of wegovy since 12/2023 and is doing well with weight loss.     Below are a copy of her last 10 weights, and BMI's with dates. Please submit this information for her appeal for wegovy. Thanks. .        12/21/2009 10/6/2010 11/1/2010 6/13/2011 12/22/2011 1/4/2012   RECENT WEIGHTS         Weight (lb) 208.45  210.43  211.58  206.44  203.53  200.11    Weight (kg) 94.55  95.45  95.97  93.64  92.32  90.77    BMI (Calculated)  34.6    33.9  33.4       1/13/2012 12/11/2013 2/9/2015 12/22/2015 2/17/2016 10/11/2016   RECENT WEIGHTS         Weight (lb) 201.92  196  196  192.24  195.77  193.78    Weight (kg) 91.59  88.905  88.905  87.2  88.8  87.9    BMI (Calculated) 33.7  31.7  31.7  31.1  31.7  31.3       1/19/2017 5/9/2017 5/10/2018 6/25/2019 8/11/2020 7/28/2021   RECENT WEIGHTS         Weight (lb) 196  198.41  206.57  194.67  188.27  186.29    Weight (kg) 88.905  90  93.7  88.3  85.4  84.5    BMI (Calculated) 32.2  32.6  33.4  31.5  30.4  30.1       8/5/2021 8/24/2022 9/28/2022 9/20/2023 11/30/2023 12/13/2023   RECENT WEIGHTS         Weight (lb) 185.19  189.49  192.46  196.21  201.28  199.63    Weight (lb)   189       Weight (kg) 84  85.95  87.3  89  91.3  90.55    Weight (kg)   85.73       BMI (Calculated) 29.9  30.6  31.1  31.7  32.5  33.2    BMI (Calculated)   30.5          5/30/2024   RECENT WEIGHTS    Weight (lb) 199.52    Weight (kg) 90.5    BMI (Calculated) 33.2      Trish Phillips MD

## 2024-09-23 ENCOUNTER — OFFICE VISIT (OUTPATIENT)
Dept: PRIMARY CARE CLINIC | Facility: CLINIC | Age: 59
End: 2024-09-23
Payer: COMMERCIAL

## 2024-09-23 VITALS
DIASTOLIC BLOOD PRESSURE: 70 MMHG | HEIGHT: 65 IN | OXYGEN SATURATION: 99 % | BODY MASS INDEX: 29.75 KG/M2 | SYSTOLIC BLOOD PRESSURE: 118 MMHG | HEART RATE: 64 BPM | WEIGHT: 178.56 LBS | TEMPERATURE: 98 F

## 2024-09-23 DIAGNOSIS — Z00.00 ROUTINE GENERAL MEDICAL EXAMINATION AT A HEALTH CARE FACILITY: Primary | ICD-10-CM

## 2024-09-23 DIAGNOSIS — I10 ESSENTIAL HYPERTENSION: ICD-10-CM

## 2024-09-23 DIAGNOSIS — E55.9 VITAMIN D DEFICIENCY DISEASE: ICD-10-CM

## 2024-09-23 DIAGNOSIS — E66.1 CLASS 1 DRUG-INDUCED OBESITY WITH SERIOUS COMORBIDITY AND BODY MASS INDEX (BMI) OF 30.0 TO 30.9 IN ADULT: ICD-10-CM

## 2024-09-23 DIAGNOSIS — G47.00 INSOMNIA, UNSPECIFIED TYPE: ICD-10-CM

## 2024-09-23 DIAGNOSIS — Z12.31 ENCOUNTER FOR SCREENING MAMMOGRAM FOR BREAST CANCER: ICD-10-CM

## 2024-09-23 PROCEDURE — 99396 PREV VISIT EST AGE 40-64: CPT | Mod: S$GLB,,, | Performed by: FAMILY MEDICINE

## 2024-09-23 PROCEDURE — 99999 PR PBB SHADOW E&M-EST. PATIENT-LVL III: CPT | Mod: PBBFAC,,, | Performed by: FAMILY MEDICINE

## 2024-09-23 RX ORDER — FUROSEMIDE 20 MG/1
20 TABLET ORAL DAILY
Qty: 90 TABLET | Refills: 3 | Status: SHIPPED | OUTPATIENT
Start: 2024-09-23

## 2024-09-23 RX ORDER — OLMESARTAN MEDOXOMIL AND HYDROCHLOROTHIAZIDE 20/12.5 20; 12.5 MG/1; MG/1
1 TABLET ORAL DAILY
Qty: 90 TABLET | Refills: 3 | Status: SHIPPED | OUTPATIENT
Start: 2024-09-23

## 2024-09-23 RX ORDER — SEMAGLUTIDE 0.5 MG/.5ML
0.5 INJECTION, SOLUTION SUBCUTANEOUS
Qty: 2 ML | Refills: 11 | Status: SHIPPED | OUTPATIENT
Start: 2024-09-23

## 2024-09-23 NOTE — PROGRESS NOTES
Chief Complaint  Chief Complaint   Patient presents with    Annual Exam       HPI  Makayla Neal is a 59 y.o. female with multiple medical diagnoses as listed in the medical history and problem list that presents for  in person visit.     History of Present Illness    CHIEF COMPLAINT:  - The patient presents for her annual exam and to discuss weight loss medication management.    HPI:  Patient, with a history of hypertension, vitamin D deficiency, early menopause, and insomnia, has been using Wegovy for weight loss. She reports positive results but has been without it for 2 months due to insurance issues. She expresses interest in progressing to the next dosage level, understanding this as the typical course for the medication.    The patient maintains a consistent exercise routine, walking 6 out of 7 days weekly. She denies constipation while on the medication.    Regarding sleep, the patient reports improved morning sleep quality. She has discontinued trazodone use due to feelings of depression or grogginess. As an alternative, she occasionally uses OTC sleep aids such as Advil PM or Excedrin PM.    The patient's weight loss progress is significant. Her initial weight on December 11, 2023, was 201.28 lbs with a BMI of 32.5. By May 30, 2024, her weight had decreased to 199.5 lbs. Currently, on September 23, 2024, her weight is 178.3 lbs with a BMI of 29.7.    The patient reports a recent dermatological issue, initially thought to be fever blisters but diagnosed as eczema by a dermatologist. The eczema appeared on her face and other areas. A prescribed steroid cream resolved the condition in 2-3 days.    MEDICATIONS:  - Wegovy 0.25 mg, weekly, for weight loss  - Antihypertensive medication  - Advil PM or Excedrin PM, as needed for sleep  - Lasix  - Steroid cream, for eczema    PMH:  - Hypertension.  Vitamin D deficiency.  Early menopause.  Insomnia.  Obesity.  Eczema.  - Menopause: Yes.  Age at menopause: Early.          Ohs Peq Sdoh    9/20/2024  9:16 AM CDT - Filed by Patient   This questionnaire should take approximately 5 to 10 minutes to complete.  To begin, press Let's Begin and then press Continue. Let's Begin   On average, how many days per week do you engage in moderate to strenuous exercise (like a brisk walk)? 6 days   On average, how many minutes do you engage in exercise at this level? 20 min   Do you feel stress - tense, restless, nervous, or anxious, or unable to sleep at night because your mind is troubled all the time - these days? To some extent   How often do you feel lonely or isolated from those around you? Never   How often do you need to have someone help you when you read instructions, pamphlets, or other written material from your doctor or pharmacy? Never   How hard is it for you to pay for the very basics like food, housing, medical care, and heating? Not hard at all   In the past 12 months has the electric, gas, oil, or water company threatened to shut off services in your home? No   Within the past 12 months, you worried that your food would run out before you got the money to buy more. Never true   Within the past 12 months, the food you bought just didn't last and you didn't have money to get more. Never true   Has the lack of transportation kept you from medical appointments, meetings, work or from getting things needed for daily living? No   In the last 12 months, was there a time when you were not able to pay the mortgage or rent on time? No   In the last 12 months, was there a time when you did not have a steady place to sleep or slept in a shelter (including now)? No   How often do you have a drink containing alcohol? 2-3 times a week   How many drinks containing alcohol do you have on a typical day when you are drinking? 1 or 2   How often do you have six or more drinks on one occasion? Never            Pmh, Psh, Family Hx, Social Hx, HM updated in Epic Tabs today.    Review of Systems  "  Constitutional:  Positive for activity change and appetite change. Negative for chills, fatigue and fever.   HENT:  Negative for ear pain and trouble swallowing.    Eyes:  Negative for pain and visual disturbance.   Respiratory:  Negative for cough and shortness of breath.    Cardiovascular:  Negative for chest pain and leg swelling.   Gastrointestinal:  Negative for abdominal pain, blood in stool, nausea and vomiting.   Endocrine: Negative for cold intolerance and heat intolerance.   Genitourinary:  Negative for dysuria and frequency.   Musculoskeletal:  Negative for joint swelling, myalgias and neck pain.   Skin:  Negative for color change and rash.   Neurological:  Negative for dizziness and headaches.   Psychiatric/Behavioral:  Positive for sleep disturbance. Negative for behavioral problems.         Objective:     Vitals:    09/23/24 0722   BP: 118/70   Pulse: 64   Temp: 98.2 °F (36.8 °C)   SpO2: 99%   Weight: 81 kg (178 lb 9.2 oz)   Height: 5' 5" (1.651 m)     Wt Readings from Last 10 Encounters:   09/23/24 81 kg (178 lb 9.2 oz)   05/30/24 90.5 kg (199 lb 8.3 oz)   12/13/23 90.5 kg (199 lb 10 oz)   11/30/23 91.3 kg (201 lb 4.5 oz)   09/20/23 89 kg (196 lb 3.4 oz)   09/28/22 85.7 kg (189 lb)   09/28/22 87.3 kg (192 lb 7.4 oz)   08/24/22 86 kg (189 lb 7.8 oz)   08/05/21 84 kg (185 lb 3 oz)   07/28/21 84.5 kg (186 lb 4.6 oz)     Physical Exam    Vitals: Height: 5'5". Weight: 199.5 lbs. BMI: 30.  IMAGING:  - Mammogram: Due       Physical Exam  Vitals reviewed.   Constitutional:       Appearance: Normal appearance. She is well-developed. She is obese.   HENT:      Head: Normocephalic and atraumatic.      Right Ear: Tympanic membrane and external ear normal.      Left Ear: Tympanic membrane and external ear normal.      Nose: Nose normal.      Mouth/Throat:      Mouth: Mucous membranes are moist.      Pharynx: Oropharynx is clear.   Eyes:      Conjunctiva/sclera: Conjunctivae normal.      Pupils: Pupils are equal, " round, and reactive to light.   Neck:      Thyroid: No thyromegaly.   Cardiovascular:      Rate and Rhythm: Normal rate and regular rhythm.      Heart sounds: Normal heart sounds. No murmur heard.     No friction rub. No gallop.   Pulmonary:      Effort: Pulmonary effort is normal. No respiratory distress.      Breath sounds: Normal breath sounds. No wheezing or rales.   Abdominal:      General: Bowel sounds are normal. There is no distension.      Palpations: Abdomen is soft.      Tenderness: There is no abdominal tenderness. There is no rebound.   Musculoskeletal:         General: Normal range of motion.      Cervical back: Normal range of motion and neck supple.   Lymphadenopathy:      Cervical: No cervical adenopathy.   Skin:     General: Skin is warm and dry.      Findings: No rash.   Neurological:      Mental Status: She is alert and oriented to person, place, and time.   Psychiatric:         Attention and Perception: Attention and perception normal.         Mood and Affect: Mood and affect normal.         Speech: Speech normal.         Behavior: Behavior normal.         Thought Content: Thought content normal.         Cognition and Memory: Cognition and memory normal.         Judgment: Judgment normal.         Assessment:     1. Routine general medical examination at a health care facility    2. Essential hypertension    3. Class 1 drug-induced obesity with serious comorbidity and body mass index (BMI) of 30.0 to 30.9 in adult    4. Vitamin D deficiency disease    5. Insomnia, unspecified type    6. Encounter for screening mammogram for breast cancer        LABS:   Lab Results   Component Value Date    HGBA1C 5.4 09/29/2023    HGBA1C 5.5 10/04/2022    HGBA1C 5.4 08/06/2021      Lab Results   Component Value Date    CHOL 240 (H) 09/29/2023    CHOL 233 (H) 10/04/2022    CHOL 236 (H) 08/06/2021     Lab Results   Component Value Date    LDLCALC 147 (H) 09/29/2023    LDLCALC 143 (H) 10/04/2022    LDLCALC 133 (H)  08/06/2021     Lab Results   Component Value Date    WBC 4.4 09/29/2023    HGB 12.1 09/29/2023    HCT 36.3 09/29/2023     09/29/2023    CHOL 240 (H) 09/29/2023    TRIG 122 09/29/2023    HDL 69 09/29/2023    ALT 11 09/29/2023    AST 16 09/29/2023     09/29/2023    K 4.6 09/29/2023     09/29/2023    CREATININE 0.79 09/29/2023    BUN 13 09/29/2023    CO2 30 09/29/2023    TSH 4.63 (H) 09/29/2023    INR 1.0 10/11/2016    HGBA1C 5.4 09/29/2023    MICROALBUR 0.5 10/04/2022       Plan:   Makayla was seen today for annual exam.    Diagnoses and all orders for this visit:    Routine general medical examination at a health care facility  -     TSH; Future  -     T4, Free; Future  -     Lipid Panel; Future  -     Hemoglobin A1C; Future  -     Comprehensive Metabolic Panel; Future  -     CBC Auto Differential; Future  -     Microalbumin/Creatinine Ratio, Urine; Future  -     Vitamin D; Future  -     Insulin, Random; Future  -     TSH  -     T4, Free  -     Lipid Panel  -     Hemoglobin A1C  -     Comprehensive Metabolic Panel  -     CBC Auto Differential  -     Microalbumin/Creatinine Ratio, Urine  -     Vitamin D  -     Insulin, Random    Essential hypertension  -     Comprehensive Metabolic Panel; Future  -     Microalbumin/Creatinine Ratio, Urine; Future  -     Comprehensive Metabolic Panel  -     Microalbumin/Creatinine Ratio, Urine  -     furosemide (LASIX) 20 MG tablet; Take 1 tablet (20 mg total) by mouth once daily.  -     olmesartan-hydrochlorothiazide (BENICAR HCT) 20-12.5 mg per tablet; Take 1 tablet by mouth once daily.    Class 1 drug-induced obesity with serious comorbidity and body mass index (BMI) of 30.0 to 30.9 in adult  -     semaglutide, weight loss, (WEGOVY) 0.5 mg/0.5 mL PnIj; Inject 0.5 mg into the skin every 7 days.  -     MY[facility] E-Visit    Vitamin D deficiency disease  -     Vitamin D; Future  -     Vitamin D    Insomnia, unspecified type  -     TSH; Future  -     T4, Free; Future  -      Comprehensive Metabolic Panel; Future  -     TSH  -     T4, Free  -     Comprehensive Metabolic Panel    Encounter for screening mammogram for breast cancer  -     Mammo Digital Screening Bilat w/ Bran; Future        Assessment & Plan    ANNUAL EXAMINATION:  - Conducted annual exam for the patient.  - Measured patient's current weight at 178.30 lbs, height at 5 feet 5 inches, and calculated BMI as 29.30.  - Ordered annual labs through [facility].  - Ordered mammogram.  - Scheduled follow up in 1 year for annual in-person visit.    FLU VACCINATION:  - Recommend continuing flu vaccination despite patient's age, explaining that it can reduce severity of illness if exposed.    WEIGHT MANAGEMENT:  - Arranged e-visit for weight management.  - Monitored patient's weight decrease from 201.28 lbs to 178.30 lbs.  - Calculated current BMI as 29.30, which is in the overweight range.  - Increased Wegovy dose from 0.25mg to 0.5mg weekly to continue weight loss progress and potentially overcome insurance coverage issues.  - Submitted prior authorization for higher Wegovy dose, including documentation of patient's weight loss progress and BMI changes.  - Considered alternative options like compounding pharmacies if insurance denies coverage.  - Explained potential side effects of higher Wegovy dose, including nausea and constipation.  - Recommend using MiraLAX or stool softener if constipation occurs with increased Wegovy dose.  - Advised increased water intake.  - Scheduled follow-up e-visit in 3 months for weight management to document progress and ensure continued insurance coverage.  - Instructed patient to contact the office if no response about Wegovy prior authorization within the next 2 days.  - Patient to continue walking 6 out of 7 days per week.  - Recommend increasing water intake.  - Discussed importance of hydration, especially when taking Wegovy.    EARLY MENOPAUSE:  - Noted patient's history of early  menopause.    HYPERTENSION:  - Continued blood pressure medication and provided refills.  - Evaluated patient's hypertension, which is controlled with current medication.    ECZEMA:  - Noted patient's history of eczema on face and other areas.  - Reviewed dermatologist's diagnosis of eczema.  - Discussed possible causes of eczema with the patient.  - Noted that dermatologist-prescribed steroid cream cleared the eczema in 2-3 days.    INSOMNIA:  - Noted patient's history of insomnia.  - Evaluated patient's report of good sleep in the morning.  - Inquired about trazodone use.  - Discontinued trazodone due to patient-reported side effects of depression and grogginess.      VITAMIN D DEFICIENCY:  - Noted patient's history of vitamin D deficiency. Labs ordered today through [facility].     Wegovy approved through insurance today. EPA completed during ov.         Mammo Digital Screening Bilat w/ Bran  Narrative: Result:  Mammo Digital Screening Bilat w/ Bran    History:  Patient is 58 y.o. and is seen for a screening mammogram.    Films Compared:  Compared to: 09/28/2022 Mammo Digital Screening Bilat w/ Bran and   08/05/2021 Mammo Digital Screening Bilat w/ Bran     Findings:  This procedure was performed using tomosynthesis.   Computer-aided detection was utilized in the interpretation of this   examination.    The breasts are almost entirely fatty. There is no evidence of suspicious   masses, microcalcifications or architectural distortion.  Impression:    No mammographic evidence of malignancy.    BI-RADS Category 1: Negative    Recommendation:  Routine screening mammogram in 1 year is recommended.    Your estimated lifetime risk of breast cancer (to age 85) based on   Tyrer-Cuzick risk assessment model is 10.95 %.  According to the American   Cancer Society, patients with a lifetime breast cancer risk of 20% or   higher might benefit from supplemental screening tests, such as screening   breast MRI.    The 10-year ASCVD  risk score (Carole GERMAIN, et al., 2019) is: 3.4%    Values used to calculate the score:      Age: 59 years      Sex: Female      Is Non- : No      Diabetic: No      Tobacco smoker: No      Systolic Blood Pressure: 118 mmHg      Is BP treated: Yes      HDL Cholesterol: 69 mg/dL      Total Cholesterol: 240 mg/dL    Follow-up: Follow up in about 1 year (around 9/23/2025) for physical with Dr GAO.    I spent a total of     25  minutes face to face and non-face to face on the date of this visit.This includes time preparing to see the patient (eg, review of tests, notes), obtaining and/or reviewing additional history from an independent historian and/or outside medical records, documenting clinical information in the electronic health record, independently interpreting results and/or communicating results to the patient/family/caregiver, or care coordinator.  Visit today included increased complexity associated with the care of the episodic problem addressed and managing the longitudinal care of the patient due to the serious and/or complex managed problem(s).    This note was generated with the assistance of ambient listening technology. Verbal consent was obtained by the patient and accompanying visitor(s) for the recording of patient appointment to facilitate this note. I attest to having reviewed and edited the generated note for accuracy, though some syntax or spelling errors may persist. Please contact the author of this note for any clarification.       There are no Patient Instructions on file for this visit.

## 2024-12-16 ENCOUNTER — E-VISIT (OUTPATIENT)
Dept: PRIMARY CARE CLINIC | Facility: CLINIC | Age: 59
End: 2024-12-16
Payer: COMMERCIAL

## 2024-12-16 DIAGNOSIS — E66.1 CLASS 1 DRUG-INDUCED OBESITY WITH SERIOUS COMORBIDITY AND BODY MASS INDEX (BMI) OF 30.0 TO 30.9 IN ADULT: Primary | ICD-10-CM

## 2024-12-16 DIAGNOSIS — E66.811 CLASS 1 DRUG-INDUCED OBESITY WITH SERIOUS COMORBIDITY AND BODY MASS INDEX (BMI) OF 30.0 TO 30.9 IN ADULT: Primary | ICD-10-CM

## 2024-12-19 NOTE — PROGRESS NOTES
Patient ID: Makayla Neal is a 59 y.o. female.    Chief Complaint: Weight Loss (Entered automatically based on patient selection in [employer].)    The patient initiated a request through [employer] on 12/16/2024 for evaluation and management with a chief complaint of Weight Loss (Entered automatically based on patient selection in [employer].)     I evaluated the questionnaire submission on 12/23/2024.    [facility] P[facility] Evisit Weight Management    12/18/2024  3:42 PM CST - Filed by Patient   Do you agree to participate in an E-Visit? Yes   If you have any of the following symptoms, please present to your local emergency room or call 911: I acknowledge   Medication requests for narcotics will not be addressed via an E-Visit.  Please schedule an appointment. I acknowledge   Choose the state of your primary residence [place]   What best describes your appetite? Small   Do you have specific food cravings? No   When you eat, how quickly do you feel full Quickly   Give an example of what you have eaten in a day in the past 2 weeks:    Breakfast: yogurt/banana or oatmeal   Lunch: chicken salad / soup   Dinner: Chicken/vegetable   Snacks: nuts, fruit, dark chocolate   Drinks: Water, Coke Zero   Have you exercised in the past week? Yes   What type of exercise? run/walk   How many days in a week do you exercise? 5   How many minutes do you exericse? 20-25   Do you have a personal or family history of thyroid cancer? No   Do you have a personal history of kidney stones? No   Do you have a personal history of seizures? No   Do you have a personal history of pancreatitis? No   I would like to address: Medication for weight loss   Do you want to address a new or existing medication? I would like to address a medication I currently take   Would you like to change or continue your medication? Continue medication   What medication would you like to continue?  Wegovy .50   Are you taking it as prescribed? Yes    What medical condition is the   medication intended to treat? Obesity, high blood pressure, pre-diabetes   Is the medication helping your condition? Yes   Are you having any side effects from the medication? No   Provide any additional information you feel is important.    Please attach any relevant images or files    Are you able to take your vital signs? No         Active Problem List with Overview Notes    Diagnosis Date Noted    Obesity with body mass index (BMI) of 30.0 to 39.9 08/13/2024    Insomnia 05/12/2018    Vitamin D deficiency disease 05/12/2018    Essential hypertension 12/11/2013    Muscle spasm 12/11/2013    Early menopause 12/11/2013    Blood clot associated with vein wall inflammation 12/11/2013      Recent Labs Obtained:  No visits with results within 7 Day(s) from this visit.   Latest known visit with results is:   Office Visit on 09/23/2024   Component Date Value Ref Range Status    TSH 10/01/2024 2.99  0.40 - 4.50 mIU/L Final    T4, Free 10/01/2024 1.1  0.8 - 1.8 ng/dL Final    Cholesterol 10/01/2024 227 (H)  <200 mg/dL Final    HDL 10/01/2024 72  > OR = 50 mg/dL Final    Triglycerides 10/01/2024 88  <150 mg/dL Final    LDL Cholesterol 10/01/2024 136 (H)  mg/dL (calc) Final    Comment: Reference range: <100     Desirable range <100 mg/dL for primary prevention;    <70 mg/dL for patients with CHD or diabetic patients   with > or = 2 CHD risk factors.     LDL-C is now calculated using the Jenaro   calculation, which is a validated novel method providing   better accuracy than the Friedewald equation in the   estimation of LDL-C.   Lonnie TIAN et al. MANGO. 2013;310(19): 6013-7164   (http://education.[employer] Co/faq/IVT669)      HDL/Cholesterol Ratio 10/01/2024 3.2  <5.0 (calc) Final    Non HDL Chol. (LDL+VLDL) 10/01/2024 155 (H)  <130 mg/dL (calc) Final    Comment: For patients with diabetes plus 1 major ASCVD risk   factor, treating to a non-HDL-C goal of <100 mg/dL   (LDL-C of <70 mg/dL) is considered a  therapeutic   option.      Hemoglobin A1C 10/01/2024 5.5  <5.7 % of total Hgb Final    Comment: For the purpose of screening for the presence of  diabetes:     <5.7%       Consistent with the absence of diabetes  5.7-6.4%    Consistent with increased risk for diabetes              (prediabetes)  > or =6.5%  Consistent with diabetes     This assay result is consistent with a decreased risk  of diabetes.     Currently, no consensus exists regarding use of  hemoglobin A1c for diagnosis of diabetes in children.     According to American Diabetes Association (ADA)  guidelines, hemoglobin A1c <7.0% represents optimal  control in non-pregnant diabetic patients. Different  metrics may apply to specific patient populations.   Standards of Medical Care in Diabetes(ADA).          Glucose 10/01/2024 86  65 - 99 mg/dL Final    Comment:               Fasting reference interval         BUN 10/01/2024 13  7 - 25 mg/dL Final    Creatinine 10/01/2024 0.77  0.50 - 1.03 mg/dL Final    eGFR 10/01/2024 89  > OR = 60 mL/min/1.73m2 Final    BUN/Creatinine Ratio 10/01/2024 SEE NOTE:  6 - 22 (calc) Final    Comment:    Not Reported: BUN and Creatinine are within     reference range.            Sodium 10/01/2024 138  135 - 146 mmol/L Final    Potassium 10/01/2024 4.5  3.5 - 5.3 mmol/L Final    Chloride 10/01/2024 100  98 - 110 mmol/L Final    CO2 10/01/2024 31  20 - 32 mmol/L Final    Calcium 10/01/2024 9.8  8.6 - 10.4 mg/dL Final    Total Protein 10/01/2024 6.9  6.1 - 8.1 g/dL Final    Albumin 10/01/2024 4.6  3.6 - 5.1 g/dL Final    Globulin, Total 10/01/2024 2.3  1.9 - 3.7 g/dL (calc) Final    Albumin/Globulin Ratio 10/01/2024 2.0  1.0 - 2.5 (calc) Final    Total Bilirubin 10/01/2024 0.4  0.2 - 1.2 mg/dL Final    Alkaline Phosphatase 10/01/2024 92  37 - 153 U/L Final    AST 10/01/2024 19  10 - 35 U/L Final    ALT 10/01/2024 12  6 - 29 U/L Final    WBC 10/01/2024 4.2  3.8 - 10.8 Thousand/uL Final    RBC 10/01/2024 4.03  3.80 - 5.10  Million/uL Final    Hemoglobin 10/01/2024 12.3  11.7 - 15.5 g/dL Final    Hematocrit 10/01/2024 37.6  35.0 - 45.0 % Final    MCV 10/01/2024 93.3  80.0 - 100.0 fL Final    MCH 10/01/2024 30.5  27.0 - 33.0 pg Final    MCHC 10/01/2024 32.7  32.0 - 36.0 g/dL Final    Comment: For adults, a slight decrease in the calculated MCHC  value (in the range of 30 to 32 g/dL) is most likely  not clinically significant; however, it should be  interpreted with caution in correlation with other  red cell parameters and the patient's clinical  condition.      RDW 10/01/2024 12.9  11.0 - 15.0 % Final    Platelets 10/01/2024 276  140 - 400 Thousand/uL Final    MPV 10/01/2024 11.4  7.5 - 12.5 fL Final    Neutrophils, Abs 10/01/2024 2,335  1,500 - 7,800 cells/uL Final    Lymph # 10/01/2024 1,369  850 - 3,900 cells/uL Final    Mono # 10/01/2024 353  200 - 950 cells/uL Final    Eos # 10/01/2024 92  15 - 500 cells/uL Final    Baso # 10/01/2024 50  0 - 200 cells/uL Final    Neutrophils Relative 10/01/2024 55.6  % Final    Lymph % 10/01/2024 32.6  % Final    Mono % 10/01/2024 8.4  % Final    Eosinophil % 10/01/2024 2.2  % Final    Basophil % 10/01/2024 1.2  % Final    Vitamin D, 25-OH, Total 10/01/2024 64  30 - 100 ng/mL Final    Comment: Vitamin D Status         25-OH Vitamin D:     Deficiency:                    <20 ng/mL  Insufficiency:             20 - 29 ng/mL  Optimal:                 > or = 30 ng/mL     For 25-OH Vitamin D testing on patients on   D2-supplementation and patients for whom quantitation   of D2 and D3 fractions is required, the QuestAssureD(TM)  25-OH VIT D, (D2,D3), LC/MS/MS is recommended: order   code 72174 (patients >2yrs).     See Note 1     Note 1     For additional information, please refer to   http://education.[employer] W/faq/PSN406   (This link is being provided for informational/  educational purposes only.)      Insulin 10/01/2024 4.4  uIU/mL  Final    Comment:       Reference Range  < or = 18.4                  Risk:        Optimal          < or = 18.4        Moderate         NA        High             >18.4                 Adult cardiovascular event risk category        cut points (optimal, moderate, high)        are based on Insulin Reference Interval        studies performed at [facility]        in 2022.                  Encounter Diagnosis   Name Primary?    Class 1 drug-induced obesity with serious comorbidity and body mass index (BMI) of 30.0 to 30.9 in adult Yes        No orders of the defined types were placed in this encounter.     Medications Ordered This Encounter   Medications    semaglutide, weight loss, (WEGOVY) 1 mg/0.5 mL PnIj     Sig: Inject 1 mg into the skin every 7 days.     Dispense:  2 mL     Refill:  2      Makayla was seen today for weight loss.    Diagnoses and all orders for this visit:    Class 1 drug-induced obesity with serious comorbidity and body mass index (BMI) of 30.0 to 30.9 in adult  -     semaglutide, weight loss, (WEGOVY) 1 mg/0.5 mL PnIj; Inject 1 mg into the skin every 7 days.       Follow up in about 3 months (around 3/16/2025) for E-visit Med F/u.      E-Visit Time Tracking:    Day 1 Time (in minutes): 11  Day 2 Time (in minutes): 4    Total Time (in minutes): 15              Patient Instructions   Makayla Neal        Thank you for using the e-visit platform to address your medication concerns.  Please see the below information in regards to your medication, refills and follow-up.      Medications Ordered This Encounter   Medications    semaglutide, weight loss, (WEGOVY) 1 mg/0.5 mL PnIj     Sig: Inject 1 mg into the skin every 7 days.     Dispense:  2 mL     Refill:  2          [id number] [place] [facility] [address]  [address]  [address]  [place] [address]  Phone: [phone] Fax: [phone]    Please let me know if you have further questions. We will need to do another video visit or e-visit in 3 months for additional  medication refills.     Thank you for allowing me to care for you!     Sincerely,   Trish Phillips MD

## 2024-12-23 VITALS — HEIGHT: 65 IN | WEIGHT: 166 LBS | BODY MASS INDEX: 27.66 KG/M2

## 2024-12-23 RX ORDER — SEMAGLUTIDE 1 MG/.5ML
1 INJECTION, SOLUTION SUBCUTANEOUS
Qty: 2 ML | Refills: 2 | Status: SHIPPED | OUTPATIENT
Start: 2024-12-23

## 2024-12-23 NOTE — PATIENT INSTRUCTIONS
Makayla Neal        Thank you for using the e-visit platform to address your medication concerns.  Please see the below information in regards to your medication, refills and follow-up.      Medications Ordered This Encounter   Medications    semaglutide, weight loss, (WEGOVY) 1 mg/0.5 mL PnIj     Sig: Inject 1 mg into the skin every 7 days.     Dispense:  2 mL     Refill:  2          [facility]/pharmacy #[id number] - [place], [place] - [address]  [address]  [address]  [place] [place] [facility] [address]  Phone: [phone] Fax: [phone]    Please let me know if you have further questions. We will need to do another video visit or e-visit in 3 months for additional medication refills.     Thank you for allowing me to care for you!     Sincerely,   Trish Phillips MD

## 2025-04-02 ENCOUNTER — PATIENT MESSAGE (OUTPATIENT)
Dept: PRIMARY CARE CLINIC | Facility: CLINIC | Age: 60
End: 2025-04-02
Payer: COMMERCIAL

## 2025-04-09 ENCOUNTER — OFFICE VISIT (OUTPATIENT)
Dept: PRIMARY CARE CLINIC | Facility: CLINIC | Age: 60
End: 2025-04-09
Payer: COMMERCIAL

## 2025-04-09 VITALS — WEIGHT: 162 LBS | BODY MASS INDEX: 26.99 KG/M2 | HEIGHT: 65 IN

## 2025-04-09 DIAGNOSIS — I10 ESSENTIAL HYPERTENSION: ICD-10-CM

## 2025-04-09 DIAGNOSIS — K59.03 DRUG INDUCED CONSTIPATION: ICD-10-CM

## 2025-04-09 DIAGNOSIS — E66.1 CLASS 1 DRUG-INDUCED OBESITY WITH SERIOUS COMORBIDITY AND BODY MASS INDEX (BMI) OF 30.0 TO 30.9 IN ADULT: Primary | ICD-10-CM

## 2025-04-09 DIAGNOSIS — E66.811 CLASS 1 DRUG-INDUCED OBESITY WITH SERIOUS COMORBIDITY AND BODY MASS INDEX (BMI) OF 30.0 TO 30.9 IN ADULT: Primary | ICD-10-CM

## 2025-04-09 RX ORDER — SEMAGLUTIDE 1.7 MG/.75ML
1.7 INJECTION, SOLUTION SUBCUTANEOUS
Qty: 3 ML | Refills: 3 | Status: SHIPPED | OUTPATIENT
Start: 2025-04-09

## 2025-04-09 NOTE — PROGRESS NOTES
Chief Complaint  Chief Complaint   Patient presents with    Follow-up     Discuss GLP medication        HPI  Makayla Neal is a 60 y.o. female with multiple medical diagnoses as listed in the medical history and problem list that presents for  virtual visit.       History of Present Illness    CHIEF COMPLAINT:  - Patient presents for a follow-up visit regarding her Wegovy medication and to discuss potentially increasing the dose.    HPI:  Patient is currently taking Wegovy 1 mg for weight loss. She has lost significant weight, dropping from 200 lbs in November to 162 lbs today, with a 4-pound loss since December when she weighed 166 lbs. She reports tolerating the current dose well but notes that each dose increase requires an adjustment period.    Her exercise routine consists of walking for 20-25 minutes at a brisk pace, currently 2 days a week, decreased from her usual 5-6 days due to cold weather reducing her motivation for outdoor activities. She plans to increase her outdoor activity as the weather improves.    Her diet includes protein-rich foods such as eggs, chicken, hummus, and beans. She takes a stool softener occasionally to manage potential constipation from the medication.    She denies any specific issues or side effects with her current Wegovy dose.    MEDICATIONS:  - Wegovy 1 mg for weight loss  - Miralax for constipation  - Stool softener, taken occasionally, for constipation  - Wegovy, increased from 0.5 mg or 0.25 mg to 1 mg in the past    PMH:  - Obesity: Patient's weight decreased from 200 lbs to 162 lbs since November         Pmh, Psh, Family Hx, Social Hx updated in Epic Tabs today.     Review of Systems   Constitutional:  Negative for activity change and unexpected weight change.   HENT:  Negative for hearing loss, rhinorrhea and trouble swallowing.    Eyes:  Negative for discharge and visual disturbance.   Respiratory:  Negative for chest tightness and wheezing.    Cardiovascular:  Negative  for chest pain and palpitations.   Gastrointestinal:  Negative for blood in stool, constipation, diarrhea and vomiting.   Endocrine: Negative for polydipsia and polyuria.   Genitourinary:  Negative for difficulty urinating, dysuria, hematuria and menstrual problem.   Musculoskeletal:  Negative for arthralgias, joint swelling and neck pain.   Neurological:  Negative for weakness and headaches.   Psychiatric/Behavioral:  Negative for confusion and dysphoric mood.            Physical Exam  Vitals reviewed.   Psychiatric:         Mood and Affect: Mood normal.         Behavior: Behavior normal.         Thought Content: Thought content normal.         Judgment: Judgment normal.       Physical Exam    Vitals: Weight: 162 lbs.  TEST RESULTS:  - Weight: This morning, 162 lbs  - Weight: December (around [place]), 166 lbs  - Weight: November, 200 lbs           ASSESSMENT/PLAN:  1. Class 1 drug-induced obesity with serious comorbidity and body mass index (BMI) of 30.0 to 30.9 in adult  -     semaglutide, weight loss, (WEGOVY) 1.7 mg/0.75 mL PnIj; Inject 1.7 mg into the skin every 7 days.  Dispense: 3 mL; Refill: 3  -     MYC E-VISIT    2. Drug induced constipation    3. Essential hypertension      Assessment & Plan    E66.811, E66.1, Z68.30 Class 1 drug-induced obesity with serious comorbidity and body mass index (BMI) of 30.0 to 30.9 in adult  K59.03 Drug induced constipation    IMPRESSION:  Increased Wegovy dose from 1 mg to 1.7 mg to address stalled weight loss and meet insurance requirements for continued coverage; provided 3 refills.  Weight loss progress: 200 lbs to 162 lbs since November, recent 4-pound loss.  Plan to reassess medication efficacy and side effects in 1 month, with option to return to 1 mg dose if needed.    PLAN SUMMARY:  - Continue reduced calorie intake and increase water consumption  - Increase Wegovy dose from 1 mg to 1.7 mg; provided 3 refills  - Take daily stool softener with increased Wegovy  dose  - Increase exercise to 5 days per week, 30 minutes daily  - Complete e-visit questionnaire on July 21st for weight check-in and dosing review  - Contact office if wanting to return to 1 mg dose or if insurance issues arise    CLASS 1 DRUG-INDUCED OBESITY WITH SERIOUS COMORBIDITY AND BODY MASS INDEX (BMI) OF 30.0 TO 30.9 IN ADULT:  - Increased Wegovy dose from 1 mg to 1.7 mg to address stalled weight loss and meet insurance requirements for continued coverage; provided 3 refills.  - Complete e-visit questionnaire on July 21st for weight check-in and dosing review.  - Contact office if wanting to return to 1 mg dose due to side effects (include current weight in the note) or if no response from insurance regarding prior authorization or if insurance denies coverage for increased dose.    DRUG INDUCED CONSTIPATION:  - Explained potential side effects of increased Wegovy dose, primarily constipation.  - Take stool softener daily with increased dose.    LIFESTYLE CHANGES:  - Patient to increase exercise to 5 days per week, aiming for 30 minutes daily (150 minutes weekly).  - Advised to monitor protein intake (70-80 g daily) to maintain muscle mass and metabolism.  - Continue reduced calorie intake and increase water consumption.  - Keep food diary for 1-2 weeks to track calorie and macro intake.       Mammo Digital Screening Bilat w/ Bran  Narrative: Result:  Mammo Digital Screening Bilat w/ Bran    History:  Patient is 58 y.o. and is seen for a screening mammogram.    Films Compared:  Compared to: 09/28/2022 Mammo Digital Screening Bilat w/ Bran and   08/05/2021 Mammo Digital Screening Bilat w/ Bran     Findings:  This procedure was performed using tomosynthesis.   Computer-aided detection was utilized in the interpretation of this   examination.    The breasts are almost entirely fatty. There is no evidence of suspicious   masses, microcalcifications or architectural distortion.  Impression:    No mammographic  evidence of malignancy.    BI-RADS Category 1: Negative    Recommendation:  Routine screening mammogram in 1 year is recommended.    Your estimated lifetime risk of breast cancer (to age 85) based on   Tyrer-Cuzick risk assessment model is 10.95 %.  According to the American   Cancer Society, patients with a lifetime breast cancer risk of 20% or   higher might benefit from supplemental screening tests, such as screening   breast MRI.    Lab Results   Component Value Date    WBC 4.2 10/01/2024    HGB 12.3 10/01/2024    HCT 37.6 10/01/2024     10/01/2024    CHOL 227 (H) 10/01/2024    TRIG 88 10/01/2024    HDL 72 10/01/2024    ALT 12 10/01/2024    AST 19 10/01/2024     10/01/2024    K 4.5 10/01/2024     10/01/2024    CREATININE 0.77 10/01/2024    BUN 13 10/01/2024    CO2 31 10/01/2024    TSH 2.99 10/01/2024    INR 1.0 10/11/2016    HGBA1C 5.5 10/01/2024    MICROALBUR 0.5 10/04/2022       Follow-up: Follow up in about 3 months (around 7/21/2025) for ole Phillips- SILVIA wt check in .    Pt also desires to start weight loss medication. She has  attempted to lose weight through a healthcare provider supervised comprehensive lifestyle program, in the past, which included reducing caloric intake by about 30% (about 500 kcals relative to the patient's estimated total energy expenditure) per day,  completing at least 150 minutes of exercise per week , AND the patient was unable to achieve a 5% weight reduction with calorie deficit goals, exercise goals and behavior therapy for at least 6 months prior to this request for beginning drug therapy. She plans to increase exercise from 2 days a week to 5 days a week. Goal of 70-80g a day to encourage and improving metabolism. Pt will complete food diary for 1-2 weeks to monitor consumption of calories and macros.   ___________________________________________________________________    Audio only  time with patient: 915am CDT - 935am     The patient location is:   home  The chief complaint leading to consultation is: noted   Visit type: Virtual visit with synchronous audio and video   Each patient to whom he or she provides medical services by telemedicine is:  (1) informed of the relationship between the physician and patient and the respective role of any other health care provider with respect to management of the patient; and (2) notified that he or she may decline to receive medical services by telemedicine and may withdraw from such care at any time.    I spent a total of   30    minutes face to face and non-face to face on the date of this visit.This includes time preparing to see the patient (eg, review of tests, notes), obtaining and/or reviewing additional history from an independent historian and/or outside medical records, documenting clinical information in the electronic health record, independently interpreting results and/or communicating results to the patient/family/caregiver, or care coordinator.  Visit today included increased complexity associated with the care of the episodic problem addressed and managing the longitudinal care of the patient due to the serious and/or complex managed problem(s).    This note was generated with the assistance of ambient listening technology. Verbal consent was obtained by the patient and accompanying visitor(s) for the recording of patient appointment to facilitate this note. I attest to having reviewed and edited the generated note for accuracy, though some syntax or spelling errors may persist. Please contact the author of this note for any clarification.

## 2025-05-27 ENCOUNTER — OFFICE VISIT (OUTPATIENT)
Dept: URGENT CARE | Facility: CLINIC | Age: 60
End: 2025-05-27
Payer: COMMERCIAL

## 2025-05-27 VITALS
DIASTOLIC BLOOD PRESSURE: 57 MMHG | HEART RATE: 59 BPM | TEMPERATURE: 98 F | RESPIRATION RATE: 16 BRPM | OXYGEN SATURATION: 100 % | WEIGHT: 161.5 LBS | SYSTOLIC BLOOD PRESSURE: 118 MMHG | BODY MASS INDEX: 26.91 KG/M2 | HEIGHT: 65 IN

## 2025-05-27 DIAGNOSIS — L03.90 CELLULITIS, UNSPECIFIED CELLULITIS SITE: ICD-10-CM

## 2025-05-27 DIAGNOSIS — T63.484A INSECT STINGS, UNDETERMINED INTENT, INITIAL ENCOUNTER: Primary | ICD-10-CM

## 2025-05-27 PROCEDURE — 99214 OFFICE O/P EST MOD 30 MIN: CPT | Mod: 25,S$GLB,, | Performed by: NURSE PRACTITIONER

## 2025-05-27 PROCEDURE — 96372 THER/PROPH/DIAG INJ SC/IM: CPT | Mod: S$GLB,,, | Performed by: NURSE PRACTITIONER

## 2025-05-27 RX ORDER — DOXYCYCLINE HYCLATE 100 MG
100 TABLET ORAL 2 TIMES DAILY
Qty: 10 TABLET | Refills: 0 | Status: SHIPPED | OUTPATIENT
Start: 2025-05-27 | End: 2025-06-01

## 2025-05-27 RX ORDER — DEXAMETHASONE SODIUM PHOSPHATE 10 MG/ML
10 INJECTION INTRAMUSCULAR; INTRAVENOUS
Status: COMPLETED | OUTPATIENT
Start: 2025-05-27 | End: 2025-05-27

## 2025-05-27 RX ADMIN — DEXAMETHASONE SODIUM PHOSPHATE 10 MG: 10 INJECTION INTRAMUSCULAR; INTRAVENOUS at 09:05

## 2025-05-27 NOTE — PATIENT INSTRUCTIONS

## 2025-05-27 NOTE — PROGRESS NOTES
"Subjective:      Patient ID: Makayla Neal is a 60 y.o. female.    Vitals:  height is 5' 5" (1.651 m) and weight is 73.2 kg (161 lb 7.8 oz). Her tympanic temperature is 97.9 °F (36.6 °C). Her blood pressure is 118/57 (abnormal) and her pulse is 59 (abnormal). Her respiration is 16 and oxygen saturation is 100%.     Chief Complaint: Insect Bite    60 yr old female presents to the Urgent Care with complaint of wasp sting to right middle finger with swelling radiating upper forearm x 2 days ago. Patient describes right middle finger as red, tender, swelling and warmth to tough. Patient has applied several OTC several creams with no relief noted. Patient denies any CP, SOB or fever.    Insect Bite  This is a new problem. The current episode started in the past 7 days. The problem occurs constantly. The problem has been rapidly worsening. Associated symptoms include joint swelling and myalgias. Pertinent negatives include no abdominal pain, anorexia, arthralgias, change in bowel habit, chest pain, chills, congestion, coughing, diaphoresis, fatigue, fever, headaches, nausea, neck pain, numbness, rash, sore throat, swollen glands, urinary symptoms, vertigo, visual change, vomiting or weakness. Treatments tried: Topical Steroid. The treatment provided no relief.       Constitution: Negative for chills, sweating, fatigue and fever.   HENT:  Negative for congestion and sore throat.    Neck: Negative for neck pain.   Cardiovascular:  Negative for chest pain.   Respiratory:  Negative for cough.    Gastrointestinal:  Negative for abdominal pain, nausea and vomiting.   Musculoskeletal:  Positive for pain, joint swelling and muscle ache. Negative for trauma and joint pain.   Skin:  Negative for rash.   Neurological:  Negative for history of vertigo, headaches and numbness.      Objective:     Physical Exam   Constitutional: She is oriented to person, place, and time. She appears well-developed.  Non-toxic appearance. She does not " appear ill. No distress.   HENT:   Head: Atraumatic.   Neck: Neck supple.   Cardiovascular:   Pulses:       Radial pulses are 2+ on the right side.   Pulmonary/Chest: Effort normal and breath sounds normal.   Musculoskeletal:      Left ankle: No head of 5th metatarsal tenderness found.        Arms:       Right hand: She exhibits tenderness and swelling. She exhibits normal range of motion, no bony tenderness, normal two-point discrimination, normal capillary refill, no deformity and no laceration. Normal sensation noted. Normal strength noted.        Hands:    Neurological: She is alert and oriented to person, place, and time. She has normal strength. Coordination and gait normal.   Skin: Skin is warm, dry and intact. Capillary refill takes less than 2 seconds.   Psychiatric: Her speech is normal and behavior is normal. Judgment and thought content normal.   Nursing note and vitals reviewed.      Assessment:     1. Insect stings, undetermined intent, initial encounter    2. Cellulitis, unspecified cellulitis site        Plan:   No lymphangitic spread visible and no fluid pockets or fluctuant abscess noted. Low suspicion ofo osteomyelitis or DVT. No immune compromise, bullae, pain out of proportion, or rapid progression necrotizing fasciitis. No purulence, penetrating trauma, known MRSA colonization, IV drug use, or SIRS from MRSA infection.    Decadron given in clinic. Plan tx with Doxycyline outpatient.  Return precautions given, patient understands and agrees with plan. All questions answered.  Instructed to follow up with PCP.    Insect stings, undetermined intent, initial encounter  -     dexAMETHasone injection 10 mg  -     doxycycline (VIBRA-TABS) 100 MG tablet; Take 1 tablet (100 mg total) by mouth 2 (two) times daily. for 5 days  Dispense: 10 tablet; Refill: 0    Cellulitis, unspecified cellulitis site  -     doxycycline (VIBRA-TABS) 100 MG tablet; Take 1 tablet (100 mg total) by mouth 2 (two) times daily.  for 5 days  Dispense: 10 tablet; Refill: 0

## 2025-06-12 ENCOUNTER — HOSPITAL ENCOUNTER (OUTPATIENT)
Dept: RADIOLOGY | Facility: HOSPITAL | Age: 60
Discharge: HOME | End: 2025-06-12
Attending: FAMILY MEDICINE
Payer: COMMERCIAL

## 2025-06-12 DIAGNOSIS — Z12.31 ENCOUNTER FOR SCREENING MAMMOGRAM FOR BREAST CANCER: ICD-10-CM

## 2025-06-12 PROCEDURE — 77063 BREAST TOMOSYNTHESIS BI: CPT | Mod: 26,,, | Performed by: RADIOLOGY

## 2025-06-12 PROCEDURE — 77067 SCR MAMMO BI INCL CAD: CPT | Mod: 26,,, | Performed by: RADIOLOGY

## 2025-06-12 PROCEDURE — 77063 BREAST TOMOSYNTHESIS BI: CPT | Mod: TC

## 2025-06-17 ENCOUNTER — RESULTS FOLLOW-UP (OUTPATIENT)
Dept: PRIMARY CARE CLINIC | Facility: CLINIC | Age: 60
End: 2025-06-17

## 2025-07-21 ENCOUNTER — E-VISIT (OUTPATIENT)
Dept: PRIMARY CARE CLINIC | Facility: CLINIC | Age: 60
End: 2025-07-21
Payer: COMMERCIAL

## 2025-07-21 DIAGNOSIS — E66.811 CLASS 1 DRUG-INDUCED OBESITY WITH SERIOUS COMORBIDITY AND BODY MASS INDEX (BMI) OF 30.0 TO 30.9 IN ADULT: ICD-10-CM

## 2025-07-21 DIAGNOSIS — E66.1 CLASS 1 DRUG-INDUCED OBESITY WITH SERIOUS COMORBIDITY AND BODY MASS INDEX (BMI) OF 30.0 TO 30.9 IN ADULT: ICD-10-CM

## 2025-07-21 PROCEDURE — 99422 OL DIG E/M SVC 11-20 MIN: CPT | Mod: ,,, | Performed by: FAMILY MEDICINE

## 2025-07-30 VITALS — BODY MASS INDEX: 26.16 KG/M2 | HEIGHT: 65 IN | WEIGHT: 157 LBS

## 2025-07-30 RX ORDER — SEMAGLUTIDE 1.7 MG/.75ML
1.7 INJECTION, SOLUTION SUBCUTANEOUS
Qty: 3 ML | Refills: 3 | Status: SHIPPED | OUTPATIENT
Start: 2025-07-30

## 2025-07-30 NOTE — PROGRESS NOTES
Patient ID: Makayla Neal is a 60 y.o. female.    Chief Complaint: Weight Loss (Entered automatically based on patient selection in [employer].)    The patient initiated a request through [employer] on 7/21/2025 for evaluation and management with a chief complaint of Weight Loss (Entered automatically based on patient selection in [employer].)     I evaluated the questionnaire submission on 07/30/2025.    [facility] Evisit Weight Management    7/30/2025 10:44 AM CDT - Filed by Patient   Do you agree to participate in an E-Visit? Yes   If you have any of the following symptoms, please present to your local emergency room or call 911:    Medication requests for narcotics will not be addressed via an E-Visit.  Please schedule an appointment. I acknowledge   Choose the state of your primary residence [place]   What is your current weight? 157   What best describes your appetite? Average   Do you have specific food cravings? No   When you eat, how quickly do you feel full Quickly   Give an example of what you have eaten in a day in the past 2 weeks:    Breakfast: Yogurt/banana   Lunch: Salad, soup, chicken   Dinner: Chicken, fish, vegetable   Snacks: hummus, rajwinder, fruit, pretzel   Drinks: Water, Diet Dr. Pepper   Have you exercised in the past week? Yes   What type of exercise? Walk/run   How many days in a week do you exercise? 5   How many minutes do you exericse? 25 - 30   Do you have a personal or family history of thyroid cancer? No   Do you have a personal history of kidney stones? No   Do you have a personal history of seizures? No   Do you have a personal history of pancreatitis? No   I would like to address: Medication for weight loss   Do you want to address a new or existing medication? (Start a new medication, Address a current medication) Address a current medication   Would you like to change or continue your medication? (Change medication, Continue medication) Continue medication   What is the name of the medication  you would like to continue? Wegovy 1.7   Are you taking your medication as prescribed? Yes    What medical condition is the  medication intended to treat? obesity   Has the medication helped your condition? (Yes, No, Not sure) Yes   Have you experienced any side effects from the medication? No   Provide any information you feel is important to your history not asked above    Please attach any relevant images or files    Are you able to take your vitals? 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   Latest known visit with results is:   Office Visit on 09/23/2024   Component Date Value Ref Range Status    TSH 10/01/2024 2.99  0.40 - 4.50 mIU/L Final    T4, Free 10/01/2024 1.1  0.8 - 1.8 ng/dL Final    Cholesterol 10/01/2024 227 (H)  <200 mg/dL Final    HDL 10/01/2024 72  > OR = 50 mg/dL Final    Triglycerides 10/01/2024 88  <150 mg/dL Final    LDL Cholesterol 10/01/2024 136 (H)  mg/dL (calc) Final    Comment: Reference range: <100     Desirable range <100 mg/dL for primary prevention;    <70 mg/dL for patients with CHD or diabetic patients   with > or = 2 CHD risk factors.     LDL-C is now calculated using the Lonnie-Marva   calculation, which is a validated novel method providing   better accuracy than the Friedewald equation in the   estimation of LDL-C.   Lonnie TIAN et al. MANGO. 2013;310(19): 4230-8875   (http://education.[employer].Libratone/faq/GDJ132)       HDL/Cholesterol Ratio 10/01/2024 3.2  <5.0 (calc) Final    Non HDL Chol. (LDL+VLDL) 10/01/2024 155 (H)  <130 mg/dL (calc) Final    Comment: For patients with diabetes plus 1 major ASCVD risk   factor, treating to a non-HDL-C goal of <100 mg/dL   (LDL-C of <70 mg/dL) is considered a therapeutic   option.      Hemoglobin A1C 10/01/2024 5.5  <5.7 % of total Hgb Final    Comment: For the purpose of screening for the presence of  diabetes:     <5.7%       Consistent with the absence of diabetes  5.7-6.4%    Consistent with increased risk for diabetes              (prediabetes)  > or =6.5%  Consistent with diabetes     This assay result is consistent with a decreased risk  of diabetes.     Currently, no consensus exists regarding use of  hemoglobin A1c for diagnosis of diabetes in children.     According to American Diabetes Association (ADA)  guidelines, hemoglobin A1c <7.0% represents optimal  control in non-pregnant diabetic patients. Different  metrics may apply to specific patient populations.   Standards of Medical Care in Diabetes(ADA).          Glucose 10/01/2024 86  65 - 99 mg/dL Final    Comment:               Fasting reference interval         BUN 10/01/2024 13  7 - 25 mg/dL Final    Creatinine 10/01/2024 0.77  0.50 - 1.03 mg/dL Final    eGFR 10/01/2024 89  > OR = 60 mL/min/1.73m2 Final    BUN/Creatinine Ratio 10/01/2024 SEE NOTE:  6 - 22 (calc) Final    Comment:    Not Reported: BUN and Creatinine are within     reference range.            Sodium 10/01/2024 138  135 - 146 mmol/L Final    Potassium 10/01/2024 4.5  3.5 - 5.3 mmol/L Final    Chloride 10/01/2024 100  98 - 110 mmol/L Final    CO2 10/01/2024 31  20 - 32 mmol/L Final    Calcium 10/01/2024 9.8  8.6 - 10.4 mg/dL Final    Total Protein 10/01/2024 6.9  6.1 - 8.1 g/dL Final    Albumin 10/01/2024 4.6  3.6 - 5.1 g/dL Final    Globulin, Total 10/01/2024 2.3  1.9 - 3.7 g/dL (calc) Final    Albumin/Globulin Ratio 10/01/2024 2.0  1.0 - 2.5 (calc) Final    Total  Bilirubin 10/01/2024 0.4  0.2 - 1.2 mg/dL Final    Alkaline Phosphatase 10/01/2024 92  37 - 153 U/L Final    AST 10/01/2024 19  10 - 35 U/L Final    ALT 10/01/2024 12  6 - 29 U/L Final    WBC 10/01/2024 4.2  3.8 - 10.8 Thousand/uL Final    RBC 10/01/2024 4.03  3.80 - 5.10 Million/uL Final    Hemoglobin 10/01/2024 12.3  11.7 - 15.5 g/dL Final    Hematocrit 10/01/2024 37.6  35.0 - 45.0 % Final    MCV 10/01/2024 93.3  80.0 - 100.0 fL Final    MCH 10/01/2024 30.5  27.0 - 33.0 pg Final    MCHC 10/01/2024 32.7  32.0 - 36.0 g/dL Final    Comment: For adults, a slight decrease in the calculated MCHC  value (in the range of 30 to 32 g/dL) is most likely  not clinically significant; however, it should be  interpreted with caution in correlation with other  red cell parameters and the patient's clinical  condition.      RDW 10/01/2024 12.9  11.0 - 15.0 % Final    Platelets 10/01/2024 276  140 - 400 Thousand/uL Final    MPV 10/01/2024 11.4  7.5 - 12.5 fL Final    Neutrophils, Abs 10/01/2024 2,335  1,500 - 7,800 cells/uL Final    Lymph # 10/01/2024 1,369  850 - 3,900 cells/uL Final    Mono # 10/01/2024 353  200 - 950 cells/uL Final    Eos # 10/01/2024 92  15 - 500 cells/uL Final    Baso # 10/01/2024 50  0 - 200 cells/uL Final    Neutrophils Relative 10/01/2024 55.6  % Final    Lymph % 10/01/2024 32.6  % Final    Mono % 10/01/2024 8.4  % Final    Eosinophil % 10/01/2024 2.2  % Final    Basophil % 10/01/2024 1.2  % Final    Vitamin D, 25-OH, Total 10/01/2024 64  30 - 100 ng/mL Final    Comment: Vitamin D Status         25-OH Vitamin D:     Deficiency:                    <20 ng/mL  Insufficiency:             20 - 29 ng/mL  Optimal:                 > or = 30 ng/mL     For 25-OH Vitamin D testing on patients on   D2-supplementation and patients for whom quantitation   of D2 and D3 fractions is required, the QuestAssureD(TM)  25-OH VIT D, (D2,D3), LC/MS/MS is recommended: order   code 96192 (patients >2yrs).     See Note 1     Note  1     For additional information, please refer to   http://education.[employer].Attracta/faq/IEM063   (This link is being provided for informational/  educational purposes only.)      Insulin 10/01/2024 4.4  uIU/mL  Final    Comment:       Reference Range  < or = 18.4                 Risk:        Optimal          < or = 18.4        Moderate         NA        High             >18.4                 Adult cardiovascular event risk category        cut points (optimal, moderate, high)        are based on Insulin Reference Interval        studies performed at [facility]        in 2022.                  Encounter Diagnosis   Name Primary?    Class 1 drug-induced obesity with serious comorbidity and body mass index (BMI) of 30.0 to 30.9 in adult         No orders of the defined types were placed in this encounter.     Medications Ordered This Encounter   Medications    semaglutide, weight loss, (WEGOVY) 1.7 mg/0.75 mL PnIj     Sig: Inject 1.7 mg into the skin every 7 days.     Dispense:  3 mL     Refill:  3     Increasing dose, using for obesity.      Makayla was seen today for weight loss.    Diagnoses and all orders for this visit:    Class 1 drug-induced obesity with serious comorbidity and body mass index (BMI) of 30.0 to 30.9 in adult  -     semaglutide, weight loss, (WEGOVY) 1.7 mg/0.75 mL PnIj; Inject 1.7 mg into the skin every 7 days.     The patient has attempted to lose weight through a healthcare provider supervised comprehensive lifestyle program, which includes reducing caloric intake by about 30% per day, and completing at least 150 minutes of exercise per week. The patient has been unable to achieve a 5% weight reduction with calorie deficit goals, exercise goals and behavior therapy for at least 6 months prior to requesting drug therapy for GLP-1 medication.     Follow up in about 3 months (around 10/21/2025) for ole Phillips.      E-Visit Time Tracking:    Day 1 Time (in minutes): 11    Total Time  (in minutes): 11              Patient Instructions   Makayla Neal        Thank you for using the e-visit platform to address your medication concerns.  Please see the below information in regards to your medication, refills and follow-up.      Medications Ordered This Encounter   Medications    semaglutide, weight loss, (WEGOVY) 1.7 mg/0.75 mL PnIj     Sig: Inject 1.7 mg into the skin every 7 days.     Dispense:  3 mL     Refill:  3     Increasing dose, using for obesity.          [facility]/pharmacy #[id number] - [place] - [address].  [address]  [address]  [facility] [address]  Phone: [phone] Fax: [phone]       Please let me know if you have further questions. We will need to do another video visit or e-visit again in 3 months for additional refills and documentation. Thank you for allowing me to care for you!     Sincerely,   Trish Phillips MD

## 2025-07-30 NOTE — PATIENT INSTRUCTIONS
Makayla Neal        Thank you for using the e-visit platform to address your medication concerns.  Please see the below information in regards to your medication, refills and follow-up.      Medications Ordered This Encounter   Medications    semaglutide, weight loss, (WEGOVY) 1.7 mg/0.75 mL PnIj     Sig: Inject 1.7 mg into the skin every 7 days.     Dispense:  3 mL     Refill:  3     Increasing dose, using for obesity.          [facility]/pharmacy #[id number] - [place] - [address].  [address]  [address]  [facility] [address]  Phone: [phone] Fax: [phone]       Please let me know if you have further questions. We will need to do another video visit or e-visit again in 3 months for additional refills and documentation. Thank you for allowing me to care for you!     Sincerely,   Trish Phillips MD